# Patient Record
Sex: MALE | Race: WHITE | Employment: OTHER | URBAN - NONMETROPOLITAN AREA
[De-identification: names, ages, dates, MRNs, and addresses within clinical notes are randomized per-mention and may not be internally consistent; named-entity substitution may affect disease eponyms.]

---

## 2017-02-23 ENCOUNTER — TELEPHONE (OUTPATIENT)
Dept: GASTROENTEROLOGY | Age: 67
End: 2017-02-23

## 2017-03-10 RX ORDER — ESOMEPRAZOLE MAGNESIUM 40 MG/1
40 CAPSULE, DELAYED RELEASE ORAL
Qty: 30 CAPSULE | Refills: 11 | Status: SHIPPED | OUTPATIENT
Start: 2017-03-10 | End: 2017-12-04 | Stop reason: ALTCHOICE

## 2017-04-11 ENCOUNTER — HOSPITAL ENCOUNTER (OUTPATIENT)
Dept: NON INVASIVE DIAGNOSTICS | Age: 67
Discharge: HOME | End: 2017-04-11
Payer: MEDICARE

## 2017-04-11 PROCEDURE — 93005 ELECTROCARDIOGRAM TRACING: CPT

## 2017-04-12 ENCOUNTER — TELEPHONE (OUTPATIENT)
Dept: GASTROENTEROLOGY | Age: 67
End: 2017-04-12

## 2017-05-12 ENCOUNTER — TELEPHONE (OUTPATIENT)
Dept: GASTROENTEROLOGY | Age: 67
End: 2017-05-12

## 2017-07-31 ENCOUNTER — OFFICE VISIT (OUTPATIENT)
Dept: CARDIOLOGY | Age: 67
End: 2017-07-31
Payer: MEDICARE

## 2017-07-31 VITALS
HEART RATE: 54 BPM | HEIGHT: 70 IN | WEIGHT: 170 LBS | DIASTOLIC BLOOD PRESSURE: 64 MMHG | SYSTOLIC BLOOD PRESSURE: 128 MMHG | BODY MASS INDEX: 24.34 KG/M2

## 2017-07-31 DIAGNOSIS — I10 ESSENTIAL HYPERTENSION: ICD-10-CM

## 2017-07-31 DIAGNOSIS — R53.82 CHRONIC FATIGUE: ICD-10-CM

## 2017-07-31 DIAGNOSIS — R00.2 PALPITATIONS: Primary | ICD-10-CM

## 2017-07-31 PROCEDURE — 3017F COLORECTAL CA SCREEN DOC REV: CPT | Performed by: INTERNAL MEDICINE

## 2017-07-31 PROCEDURE — G8427 DOCREV CUR MEDS BY ELIG CLIN: HCPCS | Performed by: INTERNAL MEDICINE

## 2017-07-31 PROCEDURE — G8420 CALC BMI NORM PARAMETERS: HCPCS | Performed by: INTERNAL MEDICINE

## 2017-07-31 PROCEDURE — 4040F PNEUMOC VAC/ADMIN/RCVD: CPT | Performed by: INTERNAL MEDICINE

## 2017-07-31 PROCEDURE — 93000 ELECTROCARDIOGRAM COMPLETE: CPT | Performed by: INTERNAL MEDICINE

## 2017-07-31 PROCEDURE — 99203 OFFICE O/P NEW LOW 30 MIN: CPT | Performed by: INTERNAL MEDICINE

## 2017-07-31 PROCEDURE — 1123F ACP DISCUSS/DSCN MKR DOCD: CPT | Performed by: INTERNAL MEDICINE

## 2017-07-31 PROCEDURE — 1036F TOBACCO NON-USER: CPT | Performed by: INTERNAL MEDICINE

## 2017-12-04 ENCOUNTER — OFFICE VISIT (OUTPATIENT)
Dept: CARDIOLOGY | Age: 67
End: 2017-12-04
Payer: MEDICARE

## 2017-12-04 VITALS
SYSTOLIC BLOOD PRESSURE: 124 MMHG | HEIGHT: 70 IN | DIASTOLIC BLOOD PRESSURE: 64 MMHG | HEART RATE: 74 BPM | WEIGHT: 185 LBS | BODY MASS INDEX: 26.48 KG/M2

## 2017-12-04 DIAGNOSIS — R00.2 PALPITATIONS: ICD-10-CM

## 2017-12-04 DIAGNOSIS — I10 ESSENTIAL HYPERTENSION: Primary | ICD-10-CM

## 2017-12-04 PROCEDURE — 4040F PNEUMOC VAC/ADMIN/RCVD: CPT | Performed by: INTERNAL MEDICINE

## 2017-12-04 PROCEDURE — 93000 ELECTROCARDIOGRAM COMPLETE: CPT | Performed by: INTERNAL MEDICINE

## 2017-12-04 PROCEDURE — G8484 FLU IMMUNIZE NO ADMIN: HCPCS | Performed by: INTERNAL MEDICINE

## 2017-12-04 PROCEDURE — 1036F TOBACCO NON-USER: CPT | Performed by: INTERNAL MEDICINE

## 2017-12-04 PROCEDURE — 99213 OFFICE O/P EST LOW 20 MIN: CPT | Performed by: INTERNAL MEDICINE

## 2017-12-04 PROCEDURE — 3017F COLORECTAL CA SCREEN DOC REV: CPT | Performed by: INTERNAL MEDICINE

## 2017-12-04 PROCEDURE — G8419 CALC BMI OUT NRM PARAM NOF/U: HCPCS | Performed by: INTERNAL MEDICINE

## 2017-12-04 PROCEDURE — 1123F ACP DISCUSS/DSCN MKR DOCD: CPT | Performed by: INTERNAL MEDICINE

## 2017-12-04 PROCEDURE — G8427 DOCREV CUR MEDS BY ELIG CLIN: HCPCS | Performed by: INTERNAL MEDICINE

## 2017-12-04 ASSESSMENT — ENCOUNTER SYMPTOMS: SHORTNESS OF BREATH: 0

## 2017-12-04 NOTE — PROGRESS NOTES
Dr Rainey Dubin, 3 yr recall    UPPER GASTROINTESTINAL ENDOSCOPY  1/18/2016    EGD BIOPSY performed by Anna Go DO at [facility]       Family History   Problem Relation Age of Onset    Stroke Mother     Heart Failure Mother     Diabetes Mother     Colon Cancer Father     Colon Polyps Father     Liver Cancer Father     Colon Cancer Maternal Grandmother     Colon Polyps Maternal Grandmother     Colon Cancer Paternal Grandfather     Colon Polyps Paternal Grandfather     Esophageal Cancer Neg Hx     Liver Disease Neg Hx     Rectal Cancer Neg Hx     Stomach Cancer Neg Hx        Social History     Social History    Marital status:      Spouse name: N/A    Number of children: N/A    Years of education: N/A     Occupational History    Not on file. Social History Main Topics    Smoking status: Never Smoker    Smokeless tobacco: Never Used    Alcohol use Yes      Comment: social    Drug use: No    Sexual activity: Yes     Partners: Female     Other Topics Concern    Not on file     Social History Narrative    No narrative on file       Allergies   Allergen Reactions    Dilaudid [Fd&C Blue #1 Al Aly-Hydromorphone] Nausea Only         Current Outpatient Prescriptions:     fluticasone (FLONASE) 50 MCG/ACT nasal spray, 2 sprays by Nasal route daily, Disp: , Rfl:     sildenafil (REVATIO) 20 MG tablet, Take 20 mg by mouth 3 4 tablets prn, Disp: , Rfl:     magnesium gluconate (MAGONATE) 500 MG tablet, Take 125 mg by mouth daily , Disp: , Rfl:     levothyroxine (SYNTHROID) 50 MCG tablet, Take 50 mcg by mouth Daily. , Disp: , Rfl:     Fexofenadine HCl (ALLEGRA PO), Take  by mouth., Disp: , Rfl:     PE:  Vitals:    12/04/17 1337   BP: 124/64   Pulse: 74       Estimated body mass index is 26.54 kg/m² as calculated from the following:    Height as of this encounter: 5' 10\" (1.778 m). Weight as of this encounter: 185 lb (83.9 kg).     General - No acute distress  Eyes - PERRL, anicteric sclerae; no lid-lag  ENMT - Atraumatic; Mucous membranes moist, oropharynx clear  Neck - trachea midline, thyroid non-tender  Cardio - No jugular venous distension                Clear s1 s2, no gallop, rub, murmur                 No edema, normal pulses  Resp - Normal effort, Clear to auscultation bilaterally  GI - abdomen soft, non-tender, no hepatosplenomegaly  Skin - warm and dry; no rashes  Psych - A+O x 3, normal affect    Lab Results   Component Value Date    CREATININE 0.8 01/30/2013    HGB 16.2 01/30/2013       ECG 12/04/17  Sinus bradycardia     Assessment, Recommendations, & Plan:  79 y.o. male with palpitations. Palpitations - We discussed the possibility that his hiatal hernia could've been putting pressure on his heart causing his arrhythmia which is possible. Regardless if he should have another event I placed a standing order for an EKG and also Zio patch. I've also given him my personal cell phone number. He is on his way to [facility] in February for vacation. I think it's okay for him to go at this point. Disposition - RTC in 4 months or sooner if needed    Thank you very much for allowing me to participate in this patient's care. Please do not hesitate to contact me for any questions or concerns. Sincerely yours,    Aurelio Cardenas MD, MSc  Structural Heart Disease Interventions  [facility]

## 2018-07-26 ENCOUNTER — OFFICE VISIT (OUTPATIENT)
Dept: GASTROENTEROLOGY | Age: 68
End: 2018-07-26
Payer: MEDICARE

## 2018-07-26 VITALS
HEIGHT: 70 IN | SYSTOLIC BLOOD PRESSURE: 110 MMHG | DIASTOLIC BLOOD PRESSURE: 70 MMHG | WEIGHT: 180 LBS | HEART RATE: 77 BPM | BODY MASS INDEX: 25.77 KG/M2 | OXYGEN SATURATION: 97 %

## 2018-07-26 DIAGNOSIS — Z12.11 SCREENING FOR COLON CANCER: ICD-10-CM

## 2018-07-26 DIAGNOSIS — Z80.0 FAMILY HISTORY OF COLON CANCER: ICD-10-CM

## 2018-07-26 DIAGNOSIS — Z86.010 HISTORY OF COLON POLYPS: Primary | ICD-10-CM

## 2018-07-26 PROBLEM — Z86.0100 HISTORY OF COLON POLYPS: Status: ACTIVE | Noted: 2018-07-26

## 2018-07-26 PROCEDURE — 1101F PT FALLS ASSESS-DOCD LE1/YR: CPT | Performed by: NURSE PRACTITIONER

## 2018-07-26 PROCEDURE — G8427 DOCREV CUR MEDS BY ELIG CLIN: HCPCS | Performed by: NURSE PRACTITIONER

## 2018-07-26 PROCEDURE — 1036F TOBACCO NON-USER: CPT | Performed by: NURSE PRACTITIONER

## 2018-07-26 PROCEDURE — 99214 OFFICE O/P EST MOD 30 MIN: CPT | Performed by: NURSE PRACTITIONER

## 2018-07-26 PROCEDURE — 1123F ACP DISCUSS/DSCN MKR DOCD: CPT | Performed by: NURSE PRACTITIONER

## 2018-07-26 PROCEDURE — G8419 CALC BMI OUT NRM PARAM NOF/U: HCPCS | Performed by: NURSE PRACTITIONER

## 2018-07-26 PROCEDURE — 4040F PNEUMOC VAC/ADMIN/RCVD: CPT | Performed by: NURSE PRACTITIONER

## 2018-07-26 PROCEDURE — 3017F COLORECTAL CA SCREEN DOC REV: CPT | Performed by: NURSE PRACTITIONER

## 2018-07-26 RX ORDER — CHOLECALCIFEROL (VITAMIN D3) 1250 MCG
1 CAPSULE ORAL WEEKLY
COMMUNITY

## 2018-07-26 RX ORDER — ALENDRONATE SODIUM 70 MG/1
70 TABLET ORAL
COMMUNITY
End: 2019-12-09

## 2018-07-26 ASSESSMENT — ENCOUNTER SYMPTOMS
NAUSEA: 0
RECTAL PAIN: 0
VOICE CHANGE: 0
ABDOMINAL PAIN: 0
BLOOD IN STOOL: 0
CONSTIPATION: 0
VOMITING: 0
COUGH: 0
SORE THROAT: 0
CHEST TIGHTNESS: 0
SHORTNESS OF BREATH: 0
BACK PAIN: 0
ABDOMINAL DISTENTION: 0
DIARRHEA: 0

## 2018-07-26 NOTE — PROGRESS NOTES
nausea, rectal pain and vomiting. Musculoskeletal: Positive for arthralgias. Negative for back pain and gait problem. Skin: Negative for pallor, rash and wound. Neurological: Negative for dizziness, weakness and light-headedness. Hematological: Negative for adenopathy. Does not bruise/bleed easily. All other systems reviewed and are negative. Objective:   Physical Exam   Constitutional: He is oriented to person, place, and time. He appears well-developed and well-nourished. No distress. /70   Pulse 77   Ht 5' 10\" (1.778 m)   Wt 180 lb (81.6 kg)   SpO2 97%   BMI 25.83 kg/m²      Eyes: Conjunctivae are normal. No scleral icterus. Neck: No tracheal deviation present. Cardiovascular: Normal rate and regular rhythm. Exam reveals no gallop and no friction rub. No murmur heard. Pulmonary/Chest: Effort normal and breath sounds normal. No respiratory distress. He has no wheezes. He has no rhonchi. He has no rales. Abdominal: Soft. Normal appearance and bowel sounds are normal. He exhibits no distension and no mass. There is no hepatomegaly. There is no tenderness. There is no rebound and no guarding. Musculoskeletal: He exhibits no edema. Neurological: He is alert and oriented to person, place, and time. He has normal strength. Skin: Skin is warm, dry and intact. No cyanosis. No pallor. Psychiatric: He has a normal mood and affect. His behavior is normal. Thought content normal. Cognition and memory are normal.       Assessment:      1. History of colon polyps    2. Family history of colon cancer    3. Screening for colon cancer          Plan:      Schedule colonoscopy screening    Instruct on bowel prep. Nothing to eat or drink after midnight the day of the exam.  Unable to drive for 24 hours after the procedure. No aspirin or nonsteroidal anti-inflammatories for 5 days before procedure.     I have discussed the benefits, alternatives, and risks (including bleeding,

## 2018-08-02 ENCOUNTER — HOSPITAL ENCOUNTER (OUTPATIENT)
Age: 68
Setting detail: OUTPATIENT SURGERY
Discharge: HOME | End: 2018-08-02
Attending: INTERNAL MEDICINE | Admitting: INTERNAL MEDICINE
Payer: MEDICARE

## 2018-08-02 ENCOUNTER — ANESTHESIA (OUTPATIENT)
Dept: OPERATING ROOM | Age: 68
End: 2018-08-02

## 2018-08-02 ENCOUNTER — ANESTHESIA EVENT (OUTPATIENT)
Dept: OPERATING ROOM | Age: 68
End: 2018-08-02

## 2018-08-02 VITALS
BODY MASS INDEX: 24.34 KG/M2 | DIASTOLIC BLOOD PRESSURE: 56 MMHG | OXYGEN SATURATION: 97 % | SYSTOLIC BLOOD PRESSURE: 93 MMHG | HEART RATE: 78 BPM | HEIGHT: 70 IN | TEMPERATURE: 97 F | RESPIRATION RATE: 18 BRPM | WEIGHT: 170 LBS

## 2018-08-02 VITALS — DIASTOLIC BLOOD PRESSURE: 85 MMHG | OXYGEN SATURATION: 96 % | SYSTOLIC BLOOD PRESSURE: 112 MMHG

## 2018-08-02 PROCEDURE — G0105 COLORECTAL SCRN; HI RISK IND: HCPCS | Performed by: INTERNAL MEDICINE

## 2018-08-02 PROCEDURE — G8907 PT DOC NO EVENTS ON DISCHARG: HCPCS

## 2018-08-02 PROCEDURE — G8918 PT W/O PREOP ORDER IV AB PRO: HCPCS

## 2018-08-02 PROCEDURE — G0105 COLORECTAL SCRN; HI RISK IND: HCPCS

## 2018-08-02 RX ORDER — LIDOCAINE HYDROCHLORIDE 10 MG/ML
1 INJECTION, SOLUTION EPIDURAL; INFILTRATION; INTRACAUDAL; PERINEURAL
Status: COMPLETED | OUTPATIENT
Start: 2018-08-02 | End: 2018-08-02

## 2018-08-02 RX ORDER — PROPOFOL 10 MG/ML
INJECTION, EMULSION INTRAVENOUS PRN
Status: DISCONTINUED | OUTPATIENT
Start: 2018-08-02 | End: 2018-08-02 | Stop reason: SDUPTHER

## 2018-08-02 RX ORDER — SODIUM CHLORIDE 9 MG/ML
INJECTION, SOLUTION INTRAVENOUS CONTINUOUS
Status: DISCONTINUED | OUTPATIENT
Start: 2018-08-02 | End: 2018-08-02 | Stop reason: HOSPADM

## 2018-08-02 RX ADMIN — LIDOCAINE HYDROCHLORIDE 25 MG: 10 INJECTION, SOLUTION EPIDURAL; INFILTRATION; INTRACAUDAL; PERINEURAL at 08:53

## 2018-08-02 RX ADMIN — SODIUM CHLORIDE: 9 INJECTION, SOLUTION INTRAVENOUS at 08:30

## 2018-08-02 RX ADMIN — PROPOFOL 200 MG: 10 INJECTION, EMULSION INTRAVENOUS at 08:53

## 2018-08-02 NOTE — ANESTHESIA PRE PROCEDURE
History:        Procedure Laterality Date    APPENDECTOMY  04/2018    partical resection of cecum    COLONOSCOPY  2004 ?     COLONOSCOPY  3/27/13    HP, isolated ulceration at TI with bx indicating moderate chronic inflammation    CYST REMOVAL  08/2016    R testicle    DIAPHRAGMATIC HERNIA REPAIR  05/17/2017    Dr. Glory Reynoso ARTHROSCOPY      NASAL SEPTUM SURGERY      OTHER SURGICAL HISTORY  05/17/2017    Jess Watt: TIF procedure (reflux)    TURP      UPPER GASTROINTESTINAL ENDOSCOPY  3/27/13    GERD, biopsy pos Ashton's, neg dysplasia. biopsies neg EoE, CS    UPPER GASTROINTESTINAL ENDOSCOPY  3/2014    gastritis, esophagitis. biopsies neg for intestinal metaplasia/dysplasia    UPPER GASTROINTESTINAL ENDOSCOPY  1/18/16    Dr Florin Antonio, 3 yr recall    UPPER GASTROINTESTINAL ENDOSCOPY  1/18/2016    EGD BIOPSY performed by Manuelito Domínguez DO at [facility]       Social History:    Social History   Substance Use Topics    Smoking status: Never Smoker    Smokeless tobacco: Never Used    Alcohol use Yes      Comment: social                                Counseling given: Not Answered      Vital Signs (Current): There were no vitals filed for this visit.                                            BP Readings from Last 3 Encounters:   07/26/18 110/70   12/04/17 124/64   07/31/17 128/64       NPO Status:                                                                                 BMI:   Wt Readings from Last 3 Encounters:   07/26/18 180 lb (81.6 kg)   12/04/17 185 lb (83.9 kg)   07/31/17 170 lb (77.1 kg)     There is no height or weight on file to calculate BMI.    CBC:   Lab Results   Component Value Date    WBC 6.35 01/30/2013    RBC 5.02 01/30/2013    HGB 16.2 01/30/2013    HCT 47.5 01/30/2013    MCV 94.6 01/30/2013    RDW 12.9 01/30/2013     01/30/2013       CMP:   Lab Results   Component Value Date     01/30/2013    K 4.2 01/30/2013     01/30/2013    CO2 32 01/30/2013    BUN 20 01/30/2013    CREATININE 0.8 01/30/2013    LABGLOM > 60 01/30/2013    GLUCOSE 95 01/30/2013    CALCIUM 9.4 01/30/2013       POC Tests: No results for input(s): POCGLU, POCNA, POCK, POCCL, POCBUN, POCHEMO, POCHCT in the last 72 hours. Coags: No results found for: PROTIME, INR, APTT    HCG (If Applicable): No results found for: PREGTESTUR, PREGSERUM, HCG, HCGQUANT     ABGs: No results found for: PHART, PO2ART, WAC9OOZ, UTU9QMW, BEART, A8FIBEEB     Type & Screen (If Applicable):  No results found for: LABABO, [address]    Anesthesia Evaluation  Patient summary reviewed and Nursing notes reviewed  Airway: Mallampati: I  TM distance: >3 FB   Neck ROM: full  Mouth opening: > = 3 FB Dental:          Pulmonary:   (+) sleep apnea: on CPAP,                             Cardiovascular:    (+) dysrhythmias:,                ROS comment: Occasional irregular     Neuro/Psych:               GI/Hepatic/Renal:   (+) GERD: poorly controlled,           Endo/Other:    (+) hypothyroidism::., .                 Abdominal:           Vascular:                                          Anesthesia Plan      general     ASA 2       Induction: intravenous. Anesthetic plan and risks discussed with patient.                       HARLEY Laura - CRNA   8/2/2018

## 2018-08-02 NOTE — H&P
Patient Name: Anand Mccormack  : 1950  MRN: [id number]    Allergies: Allergies   Allergen Reactions    Dilaudid [Fd&C Blue #1 Al Aly-Hydromorphone] Nausea Only         ENDOSCOPY / COLONOSCOPY / BRONCHOSCOPY      PRE-SEDATION ASSESSMENT      Procedure:    [x] Colonoscopy     [] Endoscopy      [] ERCP      [] Bronchoscopy      [] Other  [] History and Physical completed in chart for Inpatient or within 30 daysfrom office. I have examined the patient's status immediately prior to the procedure and:    [x] No interval change in patient status since H&P completed  [] Interval change in patient status (explained below)           BRIEF H&P    HPI/changes/indicators/diagnosis  Active Hospital Problems    Diagnosis Date Noted    Family history of colon cancer [Z80.0] 2018    History of colon polyps [Z86.010] 2018       Medications:   Prior to Admission medications    Medication Sig Start Date End Date Taking? Authorizing Provider   levothyroxine (SYNTHROID) 50 MCG tablet Take 50 mcg by mouth Daily. Yes Historical Provider, MD   Cholecalciferol (VITAMIN D3) 26881 units CAPS Take by mouth    Historical Provider, MD   alendronate (FOSAMAX) 70 MG tablet Take 70 mg by mouth every 7 days    Historical Provider, MD   fluticasone (FLONASE) 50 MCG/ACT nasal spray 2 sprays by Nasal route daily    Historical Provider, MD   sildenafil (REVATIO) 20 MG tablet Take 20 mg by mouth 3 4 tablets prn    Historical Provider, MD   magnesium gluconate (MAGONATE) 500 MG tablet Take 125 mg by mouth daily     Historical Provider, MD   Fexofenadine HCl (ALLEGRA PO) Take  by mouth. Historical Provider, MD       Allergies:   is allergic to dilaudid [fd&c blue #1 [place]-hydromorphone].       Vital Signs:   Vitals:    18 0826   BP: 131/89   Pulse: 74   Resp: 20   Temp: 97 °F (36.1 °C)   SpO2: 99%       ROS:  Cardiac:  [x]WNL  []Comments:  Pulmonary:  [x]WNL   []Comments:  Neuro/Mental Status:  [x]WNL []Comments:  Abdominal:                   Active Hospital Problems    Diagnosis Date Noted    Family history of colon cancer [Z80.0] 07/26/2018    History of colon polyps [Z86.010] 07/26/2018        All other pertinent GI symptoms negative. Physical Exam:  Cardiac:  [x]WNL  []Comments:  Pulmonary:  [x]WNL   []Comments:  Neuro/Mental Status:  [x]WNL  []Comments:  Abdominal:  [x]WNL    []Comments:  Other:   []WNL  []Comments:    Informed Consent:  The risks and benefits of the procedure have been discussed with either the patient or if they cannot consent, their representative. Assessment:  Patient examined and appropriate for planned sedation and procedure. Plan:  Proceed with planned sedation and procedure as above.     Demar Patterson DO  8:48 AM

## 2018-08-25 PROBLEM — Z12.11 SCREENING FOR COLON CANCER: Status: RESOLVED | Noted: 2018-07-26 | Resolved: 2018-08-25

## 2019-12-06 ENCOUNTER — TELEPHONE (OUTPATIENT)
Dept: CARDIOLOGY | Age: 69
End: 2019-12-06

## 2019-12-09 ENCOUNTER — OFFICE VISIT (OUTPATIENT)
Dept: CARDIOLOGY | Age: 69
End: 2019-12-09
Payer: MEDICARE

## 2019-12-09 ENCOUNTER — TELEPHONE (OUTPATIENT)
Dept: CARDIOLOGY | Age: 69
End: 2019-12-09

## 2019-12-09 VITALS
WEIGHT: 186 LBS | DIASTOLIC BLOOD PRESSURE: 82 MMHG | HEART RATE: 71 BPM | SYSTOLIC BLOOD PRESSURE: 128 MMHG | BODY MASS INDEX: 26.63 KG/M2 | HEIGHT: 70 IN

## 2019-12-09 DIAGNOSIS — R55 VASOVAGAL SYNCOPE: ICD-10-CM

## 2019-12-09 DIAGNOSIS — G47.30 SLEEP APNEA, UNSPECIFIED TYPE: ICD-10-CM

## 2019-12-09 DIAGNOSIS — R06.02 SHORTNESS OF BREATH: ICD-10-CM

## 2019-12-09 DIAGNOSIS — I48.0 PAROXYSMAL ATRIAL FIBRILLATION (HCC): Primary | ICD-10-CM

## 2019-12-09 PROBLEM — I10 HYPERTENSION: Status: RESOLVED | Noted: 2017-07-31 | Resolved: 2019-12-09

## 2019-12-09 PROCEDURE — 0296T PR EXT ECG > 48HR TO 21 DAY RCRD W/CONECT INTL RCRD: CPT | Performed by: CLINICAL NURSE SPECIALIST

## 2019-12-09 PROCEDURE — 1123F ACP DISCUSS/DSCN MKR DOCD: CPT | Performed by: CLINICAL NURSE SPECIALIST

## 2019-12-09 PROCEDURE — 4040F PNEUMOC VAC/ADMIN/RCVD: CPT | Performed by: CLINICAL NURSE SPECIALIST

## 2019-12-09 PROCEDURE — 3017F COLORECTAL CA SCREEN DOC REV: CPT | Performed by: CLINICAL NURSE SPECIALIST

## 2019-12-09 PROCEDURE — G8417 CALC BMI ABV UP PARAM F/U: HCPCS | Performed by: CLINICAL NURSE SPECIALIST

## 2019-12-09 PROCEDURE — 99204 OFFICE O/P NEW MOD 45 MIN: CPT | Performed by: CLINICAL NURSE SPECIALIST

## 2019-12-09 PROCEDURE — 1036F TOBACCO NON-USER: CPT | Performed by: CLINICAL NURSE SPECIALIST

## 2019-12-09 PROCEDURE — G8484 FLU IMMUNIZE NO ADMIN: HCPCS | Performed by: CLINICAL NURSE SPECIALIST

## 2019-12-09 PROCEDURE — G8427 DOCREV CUR MEDS BY ELIG CLIN: HCPCS | Performed by: CLINICAL NURSE SPECIALIST

## 2019-12-09 PROCEDURE — 93000 ELECTROCARDIOGRAM COMPLETE: CPT | Performed by: CLINICAL NURSE SPECIALIST

## 2019-12-09 ASSESSMENT — ENCOUNTER SYMPTOMS
FACIAL SWELLING: 0
EYE REDNESS: 0
NAUSEA: 0
CHEST TIGHTNESS: 0
SHORTNESS OF BREATH: 1
VOMITING: 0
WHEEZING: 0
COUGH: 0
ABDOMINAL PAIN: 0

## 2019-12-13 ENCOUNTER — HOSPITAL ENCOUNTER (OUTPATIENT)
Dept: NON INVASIVE DIAGNOSTICS | Age: 69
Discharge: HOME | End: 2019-12-13
Payer: MEDICARE

## 2019-12-13 DIAGNOSIS — I48.0 PAROXYSMAL ATRIAL FIBRILLATION (HCC): ICD-10-CM

## 2019-12-13 DIAGNOSIS — I48.91 ATRIAL FIBRILLATION, UNSPECIFIED TYPE (HCC): Primary | ICD-10-CM

## 2019-12-13 DIAGNOSIS — R06.02 SHORTNESS OF BREATH: ICD-10-CM

## 2019-12-13 DIAGNOSIS — R55 VASOVAGAL SYNCOPE: ICD-10-CM

## 2019-12-13 LAB
LV EF: 58 %
LVEF MODALITY: NORMAL

## 2019-12-13 PROCEDURE — 93307 TTE W/O DOPPLER COMPLETE: CPT

## 2019-12-18 ENCOUNTER — HOSPITAL ENCOUNTER (OUTPATIENT)
Dept: NUCLEAR MEDICINE | Age: 69
Discharge: HOME | End: 2019-12-20
Payer: MEDICARE

## 2019-12-18 ENCOUNTER — HOSPITAL ENCOUNTER (OUTPATIENT)
Dept: NON INVASIVE DIAGNOSTICS | Age: 69
Discharge: HOME | End: 2019-12-18
Payer: MEDICARE

## 2019-12-18 ENCOUNTER — HOSPITAL ENCOUNTER (OUTPATIENT)
Dept: NUCLEAR MEDICINE | Age: 69
End: 2019-12-18
Payer: MEDICARE

## 2019-12-18 DIAGNOSIS — I48.0 AF (PAROXYSMAL ATRIAL FIBRILLATION) (HCC): ICD-10-CM

## 2019-12-18 DIAGNOSIS — R06.02 SHORTNESS OF BREATH: ICD-10-CM

## 2019-12-18 PROCEDURE — 93017 CV STRESS TEST TRACING ONLY: CPT

## 2019-12-18 PROCEDURE — A9500 TC99M SESTAMIBI: HCPCS | Performed by: CLINICAL NURSE SPECIALIST

## 2019-12-18 PROCEDURE — 3430000000 HC RX DIAGNOSTIC RADIOPHARMACEUTICAL: Performed by: CLINICAL NURSE SPECIALIST

## 2019-12-18 PROCEDURE — 78452 HT MUSCLE IMAGE SPECT MULT: CPT | Performed by: INTERNAL MEDICINE

## 2019-12-18 PROCEDURE — 78452 HT MUSCLE IMAGE SPECT MULT: CPT

## 2019-12-18 RX ADMIN — TETRAKIS(2-METHOXYISOBUTYLISOCYANIDE)COPPER(I) TETRAFLUOROBORATE 10 MILLICURIE: 1 INJECTION, POWDER, LYOPHILIZED, FOR SOLUTION INTRAVENOUS at 13:41

## 2019-12-18 RX ADMIN — TETRAKIS(2-METHOXYISOBUTYLISOCYANIDE)COPPER(I) TETRAFLUOROBORATE 30 MILLICURIE: 1 INJECTION, POWDER, LYOPHILIZED, FOR SOLUTION INTRAVENOUS at 13:42

## 2019-12-19 LAB
LV EF: 56 %
LVEF MODALITY: NORMAL

## 2019-12-20 ENCOUNTER — TELEPHONE (OUTPATIENT)
Dept: CARDIOLOGY | Age: 69
End: 2019-12-20

## 2019-12-30 ENCOUNTER — TELEPHONE (OUTPATIENT)
Dept: CARDIOLOGY | Age: 69
End: 2019-12-30

## 2019-12-31 RX ORDER — METOPROLOL SUCCINATE 25 MG/1
12.5 TABLET, EXTENDED RELEASE ORAL 2 TIMES DAILY
Qty: 30 TABLET | Refills: 5 | Status: SHIPPED | OUTPATIENT
Start: 2019-12-31 | End: 2020-01-03 | Stop reason: SINTOL

## 2019-12-31 RX ORDER — METOPROLOL SUCCINATE 25 MG/1
12.5 TABLET, EXTENDED RELEASE ORAL DAILY
Qty: 30 TABLET | Refills: 5 | Status: CANCELLED | OUTPATIENT
Start: 2019-12-31

## 2020-01-02 ENCOUNTER — HOSPITAL ENCOUNTER (OUTPATIENT)
Dept: CARDIAC CATH/INVASIVE PROCEDURES | Age: 70
Discharge: HOME | End: 2020-01-02
Attending: INTERNAL MEDICINE | Admitting: INTERNAL MEDICINE
Payer: MEDICARE

## 2020-01-02 VITALS
BODY MASS INDEX: 25.48 KG/M2 | DIASTOLIC BLOOD PRESSURE: 80 MMHG | HEIGHT: 70 IN | WEIGHT: 178 LBS | HEART RATE: 82 BPM | OXYGEN SATURATION: 90 % | SYSTOLIC BLOOD PRESSURE: 105 MMHG | RESPIRATION RATE: 17 BRPM | TEMPERATURE: 98.1 F

## 2020-01-02 LAB
ALBUMIN SERPL-MCNC: 4.2 G/DL (ref 3.5–5.2)
ALP BLD-CCNC: 58 U/L (ref 40–130)
ALT SERPL-CCNC: 16 U/L (ref 5–41)
ANION GAP SERPL CALCULATED.3IONS-SCNC: 13 MMOL/L (ref 7–19)
AST SERPL-CCNC: 18 U/L (ref 5–40)
BILIRUB SERPL-MCNC: 0.5 MG/DL (ref 0.2–1.2)
BUN BLDV-MCNC: 18 MG/DL (ref 8–23)
CALCIUM SERPL-MCNC: 8.9 MG/DL (ref 8.8–10.2)
CHLORIDE BLD-SCNC: 101 MMOL/L (ref 98–111)
CO2: 28 MMOL/L (ref 22–29)
CREAT SERPL-MCNC: 0.8 MG/DL (ref 0.5–1.2)
GFR NON-AFRICAN AMERICAN: >60
GLUCOSE BLD-MCNC: 107 MG/DL (ref 74–109)
HCT VFR BLD CALC: 49 % (ref 42–52)
HEMOGLOBIN: 16.2 G/DL (ref 14–18)
MCH RBC QN AUTO: 32.5 PG (ref 27–31)
MCHC RBC AUTO-ENTMCNC: 33.1 G/DL (ref 33–37)
MCV RBC AUTO: 98.2 FL (ref 80–94)
PDW BLD-RTO: 12.8 % (ref 11.5–14.5)
PLATELET # BLD: 226 K/UL (ref 130–400)
PMV BLD AUTO: 10.6 FL (ref 9.4–12.4)
POTASSIUM SERPL-SCNC: 3.8 MMOL/L (ref 3.5–5)
RBC # BLD: 4.99 M/UL (ref 4.7–6.1)
SODIUM BLD-SCNC: 142 MMOL/L (ref 136–145)
TOTAL PROTEIN: 6.5 G/DL (ref 6.6–8.7)
WBC # BLD: 12.4 K/UL (ref 4.8–10.8)

## 2020-01-02 PROCEDURE — 2709999900 HC NON-CHARGEABLE SUPPLY

## 2020-01-02 PROCEDURE — C1894 INTRO/SHEATH, NON-LASER: HCPCS

## 2020-01-02 PROCEDURE — 93458 L HRT ARTERY/VENTRICLE ANGIO: CPT | Performed by: INTERNAL MEDICINE

## 2020-01-02 PROCEDURE — 6360000004 HC RX CONTRAST MEDICATION: Performed by: INTERNAL MEDICINE

## 2020-01-02 PROCEDURE — 2580000003 HC RX 258: Performed by: INTERNAL MEDICINE

## 2020-01-02 PROCEDURE — C1760 CLOSURE DEV, VASC: HCPCS

## 2020-01-02 PROCEDURE — 36415 COLL VENOUS BLD VENIPUNCTURE: CPT

## 2020-01-02 PROCEDURE — 93458 L HRT ARTERY/VENTRICLE ANGIO: CPT

## 2020-01-02 PROCEDURE — 99152 MOD SED SAME PHYS/QHP 5/>YRS: CPT | Performed by: INTERNAL MEDICINE

## 2020-01-02 PROCEDURE — 80053 COMPREHEN METABOLIC PANEL: CPT

## 2020-01-02 PROCEDURE — 99152 MOD SED SAME PHYS/QHP 5/>YRS: CPT

## 2020-01-02 PROCEDURE — 85027 COMPLETE CBC AUTOMATED: CPT

## 2020-01-02 PROCEDURE — 6360000002 HC RX W HCPCS

## 2020-01-02 PROCEDURE — 2500000003 HC RX 250 WO HCPCS

## 2020-01-02 RX ORDER — ACETAMINOPHEN 325 MG/1
650 TABLET ORAL EVERY 4 HOURS PRN
Status: DISCONTINUED | OUTPATIENT
Start: 2020-01-02 | End: 2020-01-02 | Stop reason: HOSPADM

## 2020-01-02 RX ORDER — SODIUM CHLORIDE 9 MG/ML
INJECTION, SOLUTION INTRAVENOUS CONTINUOUS
Status: DISCONTINUED | OUTPATIENT
Start: 2020-01-02 | End: 2020-01-02 | Stop reason: HOSPADM

## 2020-01-02 RX ORDER — SODIUM CHLORIDE 0.9 % (FLUSH) 0.9 %
10 SYRINGE (ML) INJECTION PRN
Status: DISCONTINUED | OUTPATIENT
Start: 2020-01-02 | End: 2020-01-02 | Stop reason: HOSPADM

## 2020-01-02 RX ORDER — NITROGLYCERIN 0.4 MG/1
0.4 TABLET SUBLINGUAL EVERY 5 MIN PRN
Status: DISCONTINUED | OUTPATIENT
Start: 2020-01-02 | End: 2020-01-02 | Stop reason: HOSPADM

## 2020-01-02 RX ORDER — ONDANSETRON 2 MG/ML
4 INJECTION INTRAMUSCULAR; INTRAVENOUS EVERY 6 HOURS PRN
Status: DISCONTINUED | OUTPATIENT
Start: 2020-01-02 | End: 2020-01-02 | Stop reason: HOSPADM

## 2020-01-02 RX ORDER — SODIUM CHLORIDE 0.9 % (FLUSH) 0.9 %
10 SYRINGE (ML) INJECTION EVERY 12 HOURS SCHEDULED
Status: DISCONTINUED | OUTPATIENT
Start: 2020-01-02 | End: 2020-01-02 | Stop reason: HOSPADM

## 2020-01-02 RX ORDER — ALPRAZOLAM 0.5 MG/1
0.5 TABLET ORAL
Status: DISCONTINUED | OUTPATIENT
Start: 2020-01-02 | End: 2020-01-02 | Stop reason: HOSPADM

## 2020-01-02 RX ADMIN — IOPAMIDOL 76 ML: 612 INJECTION, SOLUTION INTRAVENOUS at 08:43

## 2020-01-02 RX ADMIN — SODIUM CHLORIDE: 9 INJECTION, SOLUTION INTRAVENOUS at 07:35

## 2020-01-02 ASSESSMENT — ENCOUNTER SYMPTOMS
ABDOMINAL PAIN: 0
WHEEZING: 0
DIARRHEA: 0
VOMITING: 0
COUGH: 0
ABDOMINAL DISTENTION: 0
BLOOD IN STOOL: 0
SHORTNESS OF BREATH: 0
BACK PAIN: 0

## 2020-01-02 NOTE — H&P
Patient:  Reji Garcia III                  1950  MRN: [id number]    PROBLEM LIST:    Patient Active Problem List    Diagnosis Date Noted    Vasovagal syncope 12/09/2019     Priority: Low    Sleep apnea 12/09/2019     Priority: Low    History of colon polyps 07/26/2018     Priority: Low    Family history of colon cancer 07/26/2018     Priority: Low    Chronic GERD 11/01/2016     Priority: Low    Gastroesophageal reflux disease with esophagitis 10/29/2015     Priority: Low    Short-segment Ashton's esophagus 10/29/2015     Priority: Low       PRESENTATION: Reji Garcia III is a 71y.o. year old male who presents with recent sleep study showing possible atrial flutter/fibrillation. A subsequent Zio monitor showed 22% atrial fibrillation burden. He underwent a stress test as well as an echo which showed normal LV function with possible inferior ischemia. He is being referred for cardiac catheterization. He has been started on Eliquis. He has a history of vasovagal syncope. REVIEW OF SYSTEMS:  Review of Systems   Constitutional: Negative for activity change, diaphoresis and fatigue. HENT: Negative for hearing loss, nosebleeds and tinnitus. Eyes: Negative for visual disturbance. Respiratory: Negative for cough, shortness of breath and wheezing. Cardiovascular: Positive for palpitations. Negative for chest pain and leg swelling. Gastrointestinal: Negative for abdominal distention, abdominal pain, blood in stool, diarrhea and vomiting. Endocrine: Negative for cold intolerance, heat intolerance, polydipsia, polyphagia and polyuria. Genitourinary: Negative for difficulty urinating, flank pain and hematuria. Musculoskeletal: Negative for arthralgias, back pain, joint swelling and myalgias. Skin: Negative for pallor and rash. Neurological: Negative for dizziness, seizures, syncope and headaches. Psychiatric/Behavioral: Negative for behavioral problems and dysphoric mood.  The patient is not nervous/anxious. Past Medical History:      Diagnosis Date    Colon polyp     GERD (gastroesophageal reflux disease)     Irregular heart beat     on occ/better now    Osteoarthritis     knee left    PAC (premature atrial contraction)     PONV (postoperative nausea and vomiting)     Seasonal allergies     Sleep apnea     chele appliance used    Thyroid disease     Unspecified sleep apnea     Vaso vagal episode        Past Surgical History:      Procedure Laterality Date    APPENDECTOMY  04/2018    partical resection of cecum    COLONOSCOPY  2004 ?     COLONOSCOPY  3/27/13    HP, isolated ulceration at TI with bx indicating moderate chronic inflammation    CYST REMOVAL  08/2016    R testicle    DIAPHRAGMATIC HERNIA REPAIR  05/17/2017    Dr. Zamorano Setting ARTHROSCOPY      NASAL SEPTUM SURGERY      OTHER SURGICAL HISTORY  05/17/2017    Lafrances Prime: TIF procedure (reflux)    MA COLONOSCOPY FLX DX W/COLLJ SPEC WHEN PFRMD N/A 8/2/2018    Dr Patterson-diverticulosis, 5 yr recall    TURP      UPPER GASTROINTESTINAL ENDOSCOPY  3/27/13    GERD, biopsy pos Ashton's, neg dysplasia. biopsies neg EoE, CS    UPPER GASTROINTESTINAL ENDOSCOPY  3/2014    gastritis, esophagitis. biopsies neg for intestinal metaplasia/dysplasia    UPPER GASTROINTESTINAL ENDOSCOPY  1/18/2016    Dr Maura Lange, 3 yr recall       Medications Prior to Admission:    Prior to Admission medications    Medication Sig Start Date End Date Taking?  Authorizing Provider   metoprolol succinate (TOPROL XL) 25 MG extended release tablet Take 0.5 tablets by mouth 2 times daily 12/31/19   Mace Leather, APRN   MAGNESIUM MALATE PO Take 76 mg PE by mouth daily as needed    Historical Provider, MD   Calcium Carbonate-Vitamin D (CALTRATE 600+D PO) Take 1 tablet by mouth daily    Historical Provider, MD   apixaban (ELIQUIS) 5 MG TABS tablet Take 1 tablet by mouth 2 times daily 12/9/19 1/8/20  Katy Montero History:       Problem Relation Age of Onset    Stroke Mother     Heart Failure Mother     Diabetes Mother     Atrial Fibrillation Mother     Colon Cancer Father     Colon Polyps Father     Liver Cancer Father     Colon Cancer Maternal Grandmother     Colon Polyps Maternal Grandmother     Colon Cancer Paternal Grandfather     Colon Polyps Paternal Grandfather     Other Sister         neuro disease    Esophageal Cancer Neg Hx     Liver Disease Neg Hx     Rectal Cancer Neg Hx     Stomach Cancer Neg Hx      Physical Exam:    Vitals: There were no vitals taken for this visit. 24HR INTAKE/OUTPUT:  No intake or output data in the 24 hours ending 01/02/20 0653    Physical Exam  Constitutional:       Appearance: He is well-developed. HENT:      Mouth/Throat:      Pharynx: No oropharyngeal exudate. Eyes:      General: No scleral icterus. Right eye: No discharge. Left eye: No discharge. Neck:      Thyroid: No thyromegaly. Vascular: No JVD. Cardiovascular:      Rate and Rhythm: Normal rate and regular rhythm. Heart sounds: No murmur. No friction rub. No gallop. Pulmonary:      Effort: No respiratory distress. Breath sounds: No stridor. No wheezing or rales. Abdominal:      General: Bowel sounds are normal. There is no distension. Palpations: Abdomen is soft. There is no mass. Tenderness: There is no tenderness. There is no guarding or rebound. Musculoskeletal:         General: No deformity. Skin:     General: Skin is warm. Coloration: Skin is not pale. Findings: No erythema or rash. Neurological:      Mental Status: He is alert and oriented to person, place, and time. Motor: No abnormal muscle tone. Coordination: Coordination normal.      Deep Tendon Reflexes: Reflexes normal.         LAB DATA:  CBC: No results for input(s): WBC, HGB, PLT in the last 72 hours.   BMP:  No results for input(s): NA, K, CL, CO2, BUN, CREATININE, GLUCOSE in the last 72 hours. Hepatic: No results for input(s): AST, ALT, ALB, BILITOT, ALKPHOS in the last 72 hours. CK, CKMB, Troponin: @LABRCNT (CKTOTAL:3, CKMB:3, TROPONINI:3)@  Pro-BNP: No results for input(s): BNP in the last 72 hours. Lipids: No results for input(s): CHOL, HDL in the last 72 hours. Invalid input(s): LDL  ABGs: No results for input(s): PHART, ADX3GZS, PO2ART, GWV7DDM, BEART, HGBAE, R4ZCQXKW, CARBOXHGBART, 02THERAPY in the last 72 hours. INR: No results for input(s): INR in the last 72 hours. A1c:Invalid input(s): HEMOGLOBIN A1C  URINALYSIS: No results found for: NITRU, WBCUA, BACTERIA, RBCUA, BLOODU, SPECGRAV, GLUCOSEU  -----------------------------------------------------------------  IMAGING:  No orders to display         Assessment and Recommendations: This is a 71y.o. year old male with past medical history of vasovagal syncope, recent sleep study with possible atrial flutter/fibrillation, ZIO monitor showing 20% atrial fibrillation burden, started on Eliquis with subsequent echo showing normal LV function and a stress test reported as possible inferior ischemia. Referred for cardiac catheterization. Risks, benefits, alternatives of cardiac catheterization/PCI discussed with the patient and full informed consent obtained.   Acceptable Mallampati score  Consent for moderate conscious sedation  ASA 3            Electronically signed by Kristina Mae MD on 1/2/2020 at 6:53 AM

## 2020-01-03 ENCOUNTER — TELEPHONE (OUTPATIENT)
Dept: CARDIOLOGY | Age: 70
End: 2020-01-03

## 2020-01-03 RX ORDER — FLECAINIDE ACETATE 50 MG/1
50 TABLET ORAL 2 TIMES DAILY
Qty: 60 TABLET | Refills: 5 | Status: SHIPPED | OUTPATIENT
Start: 2020-01-03 | End: 2020-01-07 | Stop reason: SINTOL

## 2020-01-03 NOTE — TELEPHONE ENCOUNTER
[facility], Dr. Shane Louie would like to see Dr. Pollo Art earlier or if you could ask his advice on this.

## 2020-01-03 NOTE — TELEPHONE ENCOUNTER
Stacey-please call patient and tell him I have discussed with Dr. Cassandra Duran. Discontinue metoprolol and instead start the antiarrhythmic, flecainide 50 mg twice a day. This should have less effect on his blood pressure and should help his atrial fibrillation. I have already sent this to [facility] for him as I will be leaving for the day.

## 2020-01-03 NOTE — TELEPHONE ENCOUNTER
Pt called back and stating he looked up the side effects of flecainide and doesn't want to take that med. Pt wants to speak with Dr. Nathan Fernandez.

## 2020-01-06 ENCOUNTER — TELEPHONE (OUTPATIENT)
Dept: CARDIOLOGY | Age: 70
End: 2020-01-06

## 2020-01-07 ENCOUNTER — TELEPHONE (OUTPATIENT)
Dept: CARDIOLOGY | Age: 70
End: 2020-01-07

## 2020-01-07 ENCOUNTER — OFFICE VISIT (OUTPATIENT)
Dept: CARDIOLOGY | Age: 70
End: 2020-01-07
Payer: MEDICARE

## 2020-01-07 VITALS
SYSTOLIC BLOOD PRESSURE: 112 MMHG | HEART RATE: 93 BPM | HEIGHT: 70 IN | DIASTOLIC BLOOD PRESSURE: 64 MMHG | WEIGHT: 184 LBS | BODY MASS INDEX: 26.34 KG/M2

## 2020-01-07 PROCEDURE — 3017F COLORECTAL CA SCREEN DOC REV: CPT | Performed by: INTERNAL MEDICINE

## 2020-01-07 PROCEDURE — G8484 FLU IMMUNIZE NO ADMIN: HCPCS | Performed by: INTERNAL MEDICINE

## 2020-01-07 PROCEDURE — 4040F PNEUMOC VAC/ADMIN/RCVD: CPT | Performed by: INTERNAL MEDICINE

## 2020-01-07 PROCEDURE — G8427 DOCREV CUR MEDS BY ELIG CLIN: HCPCS | Performed by: INTERNAL MEDICINE

## 2020-01-07 PROCEDURE — 93000 ELECTROCARDIOGRAM COMPLETE: CPT | Performed by: INTERNAL MEDICINE

## 2020-01-07 PROCEDURE — 99213 OFFICE O/P EST LOW 20 MIN: CPT | Performed by: INTERNAL MEDICINE

## 2020-01-07 PROCEDURE — 1036F TOBACCO NON-USER: CPT | Performed by: INTERNAL MEDICINE

## 2020-01-07 PROCEDURE — 1123F ACP DISCUSS/DSCN MKR DOCD: CPT | Performed by: INTERNAL MEDICINE

## 2020-01-07 PROCEDURE — G8417 CALC BMI ABV UP PARAM F/U: HCPCS | Performed by: INTERNAL MEDICINE

## 2020-01-07 RX ORDER — TURMERIC ROOT EXTRACT 500 MG
TABLET ORAL 2 TIMES DAILY
COMMUNITY
End: 2021-05-27

## 2020-01-07 RX ORDER — FLECAINIDE ACETATE 50 MG/1
50 TABLET ORAL 2 TIMES DAILY
Qty: 60 TABLET | Refills: 3 | Status: SHIPPED | OUTPATIENT
Start: 2020-01-07 | End: 2020-02-17 | Stop reason: SINTOL

## 2020-01-07 NOTE — PROGRESS NOTES
therapeutic approaches, agents available and the side effects of each. Considering the fact he is fairly symptomatic he is willing to try flecainide 50 twice daily. We will obtain an EKG after 4 doses of the drug. In parallel fashion we will refer him to an electrophysiologist to discuss the possibility of an ablation. Medical records reviewed prior to today's clinic visit. More than 30 minutes spent face-to-face with patient in evaluating, and carefully explaining their problems and the planned approach.

## 2020-01-07 NOTE — TELEPHONE ENCOUNTER
Got patient in to see Dr. Helio Hernandez at [facility] on 2/18 at 8:30. I have faxed referral records. Notified patient of appointment date, time, location, and Dr. Merrill Bring office number. He voiced understanding.

## 2020-01-10 ENCOUNTER — TELEPHONE (OUTPATIENT)
Dept: CARDIOLOGY | Age: 70
End: 2020-01-10

## 2020-01-10 ENCOUNTER — OFFICE VISIT (OUTPATIENT)
Dept: CARDIOLOGY | Age: 70
End: 2020-01-10
Payer: MEDICARE

## 2020-01-10 VITALS — HEART RATE: 82 BPM | DIASTOLIC BLOOD PRESSURE: 76 MMHG | SYSTOLIC BLOOD PRESSURE: 120 MMHG

## 2020-01-10 PROCEDURE — 4040F PNEUMOC VAC/ADMIN/RCVD: CPT | Performed by: INTERNAL MEDICINE

## 2020-01-10 PROCEDURE — G8428 CUR MEDS NOT DOCUMENT: HCPCS | Performed by: INTERNAL MEDICINE

## 2020-01-10 PROCEDURE — G8484 FLU IMMUNIZE NO ADMIN: HCPCS | Performed by: INTERNAL MEDICINE

## 2020-01-10 PROCEDURE — G8417 CALC BMI ABV UP PARAM F/U: HCPCS | Performed by: INTERNAL MEDICINE

## 2020-01-10 PROCEDURE — 1123F ACP DISCUSS/DSCN MKR DOCD: CPT | Performed by: INTERNAL MEDICINE

## 2020-01-10 PROCEDURE — 3017F COLORECTAL CA SCREEN DOC REV: CPT | Performed by: INTERNAL MEDICINE

## 2020-01-10 PROCEDURE — 93000 ELECTROCARDIOGRAM COMPLETE: CPT | Performed by: INTERNAL MEDICINE

## 2020-01-10 PROCEDURE — 1036F TOBACCO NON-USER: CPT | Performed by: INTERNAL MEDICINE

## 2020-01-10 PROCEDURE — 99213 OFFICE O/P EST LOW 20 MIN: CPT | Performed by: INTERNAL MEDICINE

## 2020-01-10 NOTE — TELEPHONE ENCOUNTER
Patient came in for EKG only to monitor starting Flecainide therapy. Per Dr. Scout Pizarro EKG looked good, continue present therapy and return in 6 weeks. Advised patient of this and he voiced understanding.

## 2020-02-17 ENCOUNTER — OFFICE VISIT (OUTPATIENT)
Dept: CARDIOLOGY | Age: 70
End: 2020-02-17
Payer: MEDICARE

## 2020-02-17 VITALS
HEIGHT: 70 IN | HEART RATE: 78 BPM | WEIGHT: 186 LBS | DIASTOLIC BLOOD PRESSURE: 70 MMHG | BODY MASS INDEX: 26.63 KG/M2 | SYSTOLIC BLOOD PRESSURE: 120 MMHG

## 2020-02-17 PROCEDURE — 93000 ELECTROCARDIOGRAM COMPLETE: CPT | Performed by: INTERNAL MEDICINE

## 2020-02-17 PROCEDURE — G8417 CALC BMI ABV UP PARAM F/U: HCPCS | Performed by: INTERNAL MEDICINE

## 2020-02-17 PROCEDURE — 99211 OFF/OP EST MAY X REQ PHY/QHP: CPT | Performed by: INTERNAL MEDICINE

## 2020-02-17 PROCEDURE — G8427 DOCREV CUR MEDS BY ELIG CLIN: HCPCS | Performed by: INTERNAL MEDICINE

## 2020-02-17 RX ORDER — CHLORAL HYDRATE 500 MG
1000 CAPSULE ORAL DAILY PRN
COMMUNITY
End: 2022-02-24

## 2020-02-17 NOTE — PROGRESS NOTES
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 He and his wife tell me that his heart rate was all over the place, he had low blood pressure, and all things considered just felt poorly. Consequently the drug was discontinued. Is to his frequency of atrial fibrillation 1 monitor review suggested it occurred 22% of the time. I am not sure how reliable this documentation might be. He has an appointment with Dr. Petrona Sheth tomorrow for further pursued. On return today his pressure is 120/70 with a regular pulse of 78. His EKG revealed a sinus mechanism with poor R wave progression and low voltage in the precordial leads.

## 2020-06-09 RX ORDER — APIXABAN 5 MG/1
TABLET, FILM COATED ORAL
Qty: 60 TABLET | Refills: 5 | Status: SHIPPED | OUTPATIENT
Start: 2020-06-09 | End: 2020-12-09

## 2020-08-18 ENCOUNTER — OFFICE VISIT (OUTPATIENT)
Dept: CARDIOLOGY | Age: 70
End: 2020-08-18
Payer: MEDICARE

## 2020-08-18 VITALS
SYSTOLIC BLOOD PRESSURE: 124 MMHG | DIASTOLIC BLOOD PRESSURE: 64 MMHG | HEIGHT: 70 IN | BODY MASS INDEX: 25.62 KG/M2 | WEIGHT: 179 LBS | HEART RATE: 94 BPM

## 2020-08-18 PROCEDURE — 3017F COLORECTAL CA SCREEN DOC REV: CPT | Performed by: INTERNAL MEDICINE

## 2020-08-18 PROCEDURE — G8417 CALC BMI ABV UP PARAM F/U: HCPCS | Performed by: INTERNAL MEDICINE

## 2020-08-18 PROCEDURE — 1036F TOBACCO NON-USER: CPT | Performed by: INTERNAL MEDICINE

## 2020-08-18 PROCEDURE — G8427 DOCREV CUR MEDS BY ELIG CLIN: HCPCS | Performed by: INTERNAL MEDICINE

## 2020-08-18 PROCEDURE — 4040F PNEUMOC VAC/ADMIN/RCVD: CPT | Performed by: INTERNAL MEDICINE

## 2020-08-18 PROCEDURE — 93000 ELECTROCARDIOGRAM COMPLETE: CPT | Performed by: INTERNAL MEDICINE

## 2020-08-18 PROCEDURE — 99212 OFFICE O/P EST SF 10 MIN: CPT | Performed by: INTERNAL MEDICINE

## 2020-08-18 PROCEDURE — 1123F ACP DISCUSS/DSCN MKR DOCD: CPT | Performed by: INTERNAL MEDICINE

## 2020-08-18 RX ORDER — ROSUVASTATIN CALCIUM 5 MG/1
5 TABLET, COATED ORAL NIGHTLY
Qty: 30 TABLET | Refills: 3 | Status: SHIPPED | OUTPATIENT
Start: 2020-08-18 | End: 2020-11-09

## 2020-08-18 NOTE — PROGRESS NOTES
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 Prior to this procedure angiographic assessment revealed preserved LV systolic function and luminal irregularities with some ectasia of his proximal LAD. Since his procedure he has had some palpitations and some very brief little \"runs. \"  He denies any significant semi-sustained or sustained rhythm disturbance. He has been careful with his social distancing. The only real complaint is some occasional orthostasis. On exam he carries 179 pounds in a 5 foot 10 inch frame. Pressure is 124/54 with a pulse of 94 with an occasional extrasystole. EOMs full, sclerae and conjunctiva normal. PERRLA. Mask in place. Trachea midline with no neck masses. Assessment of internal jugular veins reveals no elevation of central venous pressure at 45 degrees. Carotid pulses normal without delay or bruit. Thyroid normal to palpation. Chest exam reveals normal respiratory effort, no abnormal breath sounds and normal expiratory phase. No skin lesions seen. PMI normal. S1, S2 normal without murmur or ray or click. Normal bowel sounds without palpable mass or bruit. No clubbing or acrocyanosis. No significant lower extremity edema or signs of venous insufficiency. General motor strength appears to be within normal limits. Normal range of motion with normal gait. Alert, oriented x 3, memory and cognition normal as reflected by history and conversation. EKG reveals a sinus rhythm with PACs and 2 PVCs along with poor R wave progression and low voltage in the precordial leads and nonspecific ST-T wave abnormalities. Assessment/plan:  1. Post ablation -no symptoms to suggest recurrent atrial fibrillation of any duration. Has EP follow-up appointment in September. Continue Eliquis. 2.  Dyslipidemia -March values , HDL 53, triglycerides 85.   With his scattered luminal irregularities and ectasia will treat with Crestor 5 nightly with a lipid and liver in 2 months and instructions to stop the drug in the event of myalgias.

## 2020-11-09 RX ORDER — ROSUVASTATIN CALCIUM 5 MG/1
TABLET, COATED ORAL
Qty: 90 TABLET | Refills: 1 | Status: SHIPPED | OUTPATIENT
Start: 2020-11-09 | End: 2021-05-27

## 2020-12-08 ENCOUNTER — TELEPHONE (OUTPATIENT)
Dept: CARDIOLOGY | Age: 70
End: 2020-12-08

## 2020-12-08 NOTE — TELEPHONE ENCOUNTER
Jose Miguel Hammer, nurse with  surgery center called stating patient was about to be discharged from their care but his  ekg showed afib/aflutter  and gets down to 89 when he converts to SR. She wanted to know if patient should resume eliquis. Advised per our last note it he was still taking it. Per patient Dr. Brigid Gutierrez discontinued eliquis in September. Should patient resume taking eliquis or does he need to call Dr. Brigid Gutierrez? Please advise.

## 2020-12-09 ENCOUNTER — OFFICE VISIT (OUTPATIENT)
Dept: CARDIOLOGY | Age: 70
End: 2020-12-09
Payer: MEDICARE

## 2020-12-09 VITALS
WEIGHT: 185 LBS | HEART RATE: 78 BPM | BODY MASS INDEX: 26.48 KG/M2 | SYSTOLIC BLOOD PRESSURE: 118 MMHG | DIASTOLIC BLOOD PRESSURE: 78 MMHG | HEIGHT: 70 IN

## 2020-12-09 PROCEDURE — 0296T PR EXT ECG > 48HR TO 21 DAY RCRD W/CONECT INTL RCRD: CPT | Performed by: CLINICAL NURSE SPECIALIST

## 2020-12-09 PROCEDURE — 4040F PNEUMOC VAC/ADMIN/RCVD: CPT | Performed by: CLINICAL NURSE SPECIALIST

## 2020-12-09 PROCEDURE — G8484 FLU IMMUNIZE NO ADMIN: HCPCS | Performed by: CLINICAL NURSE SPECIALIST

## 2020-12-09 PROCEDURE — 93000 ELECTROCARDIOGRAM COMPLETE: CPT | Performed by: CLINICAL NURSE SPECIALIST

## 2020-12-09 PROCEDURE — 1036F TOBACCO NON-USER: CPT | Performed by: CLINICAL NURSE SPECIALIST

## 2020-12-09 PROCEDURE — 1123F ACP DISCUSS/DSCN MKR DOCD: CPT | Performed by: CLINICAL NURSE SPECIALIST

## 2020-12-09 PROCEDURE — 99213 OFFICE O/P EST LOW 20 MIN: CPT | Performed by: CLINICAL NURSE SPECIALIST

## 2020-12-09 PROCEDURE — 3017F COLORECTAL CA SCREEN DOC REV: CPT | Performed by: CLINICAL NURSE SPECIALIST

## 2020-12-09 PROCEDURE — G8417 CALC BMI ABV UP PARAM F/U: HCPCS | Performed by: CLINICAL NURSE SPECIALIST

## 2020-12-09 PROCEDURE — G8427 DOCREV CUR MEDS BY ELIG CLIN: HCPCS | Performed by: CLINICAL NURSE SPECIALIST

## 2020-12-09 RX ORDER — ASCORBIC ACID 500 MG
1000 TABLET ORAL DAILY
COMMUNITY
End: 2022-02-24

## 2020-12-09 ASSESSMENT — ENCOUNTER SYMPTOMS
WHEEZING: 0
VOMITING: 0
CHEST TIGHTNESS: 0
SHORTNESS OF BREATH: 0
COUGH: 0
NAUSEA: 0
FACIAL SWELLING: 0
EYE REDNESS: 0
ABDOMINAL PAIN: 0

## 2020-12-09 NOTE — PATIENT INSTRUCTIONS
Return in about 5 weeks (around 1/13/2021).    Restart Eliquis 5mg twice a day  Zio Patch 2 week monitor

## 2020-12-09 NOTE — PROGRESS NOTES
[facility], [address] [place] [address], [id number]  Phone: [phone]  Fax: [phone]    OFFICE VISIT:  2020    Audra Portillo Block III - : 1950    Reason For Visit:  Bri Candelaria is a 79 y.o. male who is here for Follow-up (patient has afib issues after and during surgery yesterday) and Atrial Fibrillation       Diagnosis Orders   1. Paroxysmal atrial fibrillation (HCC)  EKG 12 lead    [place] EXT ECG > 48HR TO 21 DAY RCRD W/CONECT INTL RCRD   2. Coronary artery disease involving native coronary artery of native heart without angina pectoris     3. Closed fracture of left hand, sequela           HPI  Patient is here for follow-up today with a history of paroxysmal atrial fibrillation status post ablation in 2020 by Dr. Cata Yoon. 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 He had outpatient surgery yesterday. Postoperatively, atrial fibrillation was noted. Patient was asked to come into the office to discuss. He has currently been off anticoagulation per Dr. Josette Szymanski recommendation after ablation. Patient states he is symptomatic if he has [place] lot\" of A. fib, but otherwise does not always notice if he has short episodes. He denies any syncope or near syncope. He states he has no awareness of palpitations and fast heart rate since yesterday. He denies chest pain or unusual dyspnea. Tomas Recio MD is PCP.   Keren Araujo has the following history as recorded in [facility]:    Patient Active Problem List    Diagnosis Date Noted    Coronary artery disease involving native coronary artery      Priority: High    Vasovagal syncope 2019    Sleep apnea 2019    History of colon polyps 2018    Family history of colon cancer 2018    Chronic GERD 2016    Gastroesophageal reflux disease with esophagitis 10/29/2015    Short-segment Ashton's esophagus 10/29/2015     Past Neg Hx     Rectal Cancer Neg Hx     Stomach Cancer Neg Hx      Social History     Tobacco Use    Smoking status: Never Smoker    Smokeless tobacco: Never Used   Substance Use Topics    Alcohol use: Yes     Comment: social      Current Outpatient Medications   Medication Sig Dispense Refill    vitamin C (ASCORBIC ACID) 500 MG tablet Take 1,000 mg by mouth daily      apixaban (ELIQUIS) 5 MG TABS tablet Take 1 tablet by mouth 2 times daily 60 tablet 5    Multiple Vitamins-Minerals (PRESERVISION AREDS 2+MULTI VIT PO) Take by mouth      Omega-3 Fatty Acids (FISH OIL) 1000 MG CAPS Take 1,000 mg by mouth daily      Probiotic Product (PROBIOTIC PO) Take by mouth      MAGNESIUM MALATE PO Take 76 mg PE by mouth daily as needed      Calcium Carbonate-Vitamin D (CALTRATE 600+D PO) Take 1 tablet by mouth daily      Cholecalciferol (VITAMIN D3) 33196 units CAPS Take by mouth once a week       fluticasone (FLONASE) 50 MCG/ACT nasal spray 2 sprays by Nasal route daily      sildenafil (REVATIO) 20 MG tablet Take 20 mg by mouth 3 4 tablets prn      levothyroxine (SYNTHROID) 50 MCG tablet Take 50 mcg by mouth Daily.  Fexofenadine HCl (ALLEGRA PO) Take by mouth as needed       rosuvastatin (CRESTOR) 5 MG tablet TAKE 1 TABLET BY MOUTH EVERY DAY AT NIGHT (Patient not taking: Reported on 12/9/2020) 90 tablet 1    Turmeric 500 MG TABS Take by mouth 2 times daily        No current facility-administered medications for this visit. Allergies: Dilaudid [fd&c blue #1 al lake-hydromorphone]    Review of Systems  Review of Systems   Constitutional: Negative for activity change, diaphoresis, fatigue, fever and unexpected weight change. HENT: Negative for facial swelling and nosebleeds. Eyes: Negative for redness and visual disturbance. Respiratory: Negative for cough, chest tightness, shortness of breath and wheezing. Cardiovascular: Negative for chest pain, palpitations and leg swelling.    Gastrointestinal: Negative for abdominal pain, nausea and vomiting. Endocrine: Negative for cold intolerance and heat intolerance. Genitourinary: Negative for dysuria and hematuria. Musculoskeletal: Negative for arthralgias and myalgias. C/o left hand pain, surgery yesterday   Skin: Negative for pallor and rash. Neurological: Negative for dizziness, seizures, syncope, weakness and light-headedness. Hematological: Does not bruise/bleed easily. Psychiatric/Behavioral: Negative for agitation. The patient is not nervous/anxious. Objective  Vital Signs - /78   Pulse 78   Ht 5' 10\" (1.778 m)   Wt 185 lb (83.9 kg)   BMI 26.54 kg/m²   Physical Exam  Vitals signs and nursing note reviewed. Constitutional:       General: He is not in acute distress. Appearance: Normal appearance. He is well-developed. He is not diaphoretic. HENT:      Head: Normocephalic and atraumatic. Right Ear: Hearing and external ear normal.      Left Ear: Hearing and external ear normal.      Nose: Nose normal.   Eyes:      General:         Right eye: No discharge. Left eye: No discharge. Pupils: Pupils are equal, round, and reactive to light. Neck:      Musculoskeletal: Neck supple. No muscular tenderness. Thyroid: No thyromegaly. Vascular: No carotid bruit or JVD. Trachea: No tracheal deviation. Cardiovascular:      Rate and Rhythm: Normal rate and regular rhythm. Heart sounds: Normal heart sounds. No murmur. No friction rub. No gallop. Pulmonary:      Effort: Pulmonary effort is normal. No respiratory distress. Breath sounds: Normal breath sounds. No wheezing or rales. Abdominal:      Palpations: Abdomen is soft. Tenderness: There is no abdominal tenderness. Musculoskeletal:         General: No swelling or deformity. Comments: Left hand cast   Skin:     General: Skin is warm and dry. Findings: No rash. Neurological:      General: No focal deficit present. Mental Status: He is alert and oriented to person, place, and time. Cranial Nerves: No cranial nerve deficit. Psychiatric:         Mood and Affect: Mood normal.         Behavior: Behavior normal.         Judgment: Judgment normal.         Data:  EKG shows normal sinus rhythm rate 78    Heart Cath 1/20  Conclusions    Nonobstructive CAD with minimal disease. Normal LV ejection fraction. Echo 12/19   Findings      Mitral Valve   Structurally normal.   Normal mitral valve leaflet mobility. Trace mitral regurgitation. Aortic Valve   Aortic valve appears to be tricuspid. Structurally normal aortic valve. Trace aortic regurgitation. Tricuspid Valve   Tricuspid valve is structurally normal.   Mild tricuspid regurgitation. Pulmonic Valve   The pulmonic valve was not well visualized. Left Atrium   Mildly dilated left atrium. Left Ventricle   Normal left ventricular size with preserved LV function and an estimated   ejection fraction of approximately 55-60%. Abnormal rhythm precludes assessment of diastolic function. No evidence of left ventricular mass or thrombus noted. Right Atrium   Normal right atrial dimension with no evidence of thrombus or mass noted. Right Ventricle   Normal right ventricular size with preserved RV function. Right ventricular systolic pressure is noted at 26 mmHg. Pericardial Effusion   No evidence of significant pericardial effusion is noted. Pleural Effusion   No evidence of pleural effusion. Miscellaneous   Aortic root is within normal limits. Assessment:     Diagnosis Orders   1. Paroxysmal atrial fibrillation (HCC)  EKG 12 lead    UT EXT ECG > 48HR TO 21 DAY RCRD W/CONECT INTL RCRD   2. Coronary artery disease involving native coronary artery of native heart without angina pectoris     3.  Closed fracture of left hand, sequela         Paroxysmal atrial fibrillation- recurrence postoperatively yesterday with hand

## 2020-12-09 NOTE — TELEPHONE ENCOUNTER
Patient notified that appt is needed and to restart eliquis. Appt scheduled for 12/9/20 at 1:45 with Lore.  Patient voiced understanding

## 2021-01-07 ENCOUNTER — TELEPHONE (OUTPATIENT)
Dept: CARDIOLOGY CLINIC | Age: 71
End: 2021-01-07

## 2021-01-07 NOTE — TELEPHONE ENCOUNTER
Patient contacted by phone with results of Zio patch heart monitor. He had a 2% burden of atrial fibrillation rate ranging . He has been intolerant to beta-blockers due to hypotension in the past was intolerant to flecainide. Eliquis is expensive and not covered by his drug plan. We discussed alternatives such as Xarelto or Coumadin. He would like to stay on Eliquis for the time being. He can check with our office for samples. We discussed stroke risk with atrial fibrillation he verbalized understanding. He has an upcoming appointment with Dr. Eduardo Roman his electrophysiologist in the next month. We will forward results to his office.   Patient may cancel appointment on 1/13/2021 and reschedule for about 2 months

## 2021-01-07 NOTE — TELEPHONE ENCOUNTER
Please cancel patient's appointment on 1/13/2021 as I discussed his results by phone. He looks like he also has a February appointment with Cristian Mendieta. This can be canceled as well.   Reschedule follow-up appointment in about 2 months

## 2021-01-08 NOTE — TELEPHONE ENCOUNTER
Faxing pat results to  in [place]. Will cancel appts and get patient rescheduled in two months.

## 2021-01-11 ENCOUNTER — HOSPITAL ENCOUNTER (OUTPATIENT)
Dept: NON INVASIVE DIAGNOSTICS | Age: 71
Discharge: HOME | End: 2021-01-11
Payer: MEDICARE

## 2021-01-11 LAB
EKG P AXIS: 68 DEGREES
EKG P-R INTERVAL: 156 MS
EKG Q-T INTERVAL: 384 MS
EKG QRS DURATION: 84 MS
EKG QTC CALCULATION (BAZETT): 392 MS
EKG T AXIS: 64 DEGREES

## 2021-01-11 PROCEDURE — 93010 ELECTROCARDIOGRAM REPORT: CPT | Performed by: INTERNAL MEDICINE

## 2021-01-11 PROCEDURE — 93005 ELECTROCARDIOGRAM TRACING: CPT | Performed by: FAMILY MEDICINE

## 2021-01-18 ENCOUNTER — IMMUNIZATION (OUTPATIENT)
Age: 71
End: 2021-01-18
Payer: MEDICARE

## 2021-01-18 DIAGNOSIS — Z23 NEED FOR VACCINATION: Primary | ICD-10-CM

## 2021-01-18 PROCEDURE — 0001A COVID-19, PFIZER VACCINE 30MCG/0.3ML DOSE: CPT | Performed by: FAMILY MEDICINE

## 2021-01-18 PROCEDURE — 91300 COVID-19, PFIZER VACCINE 30MCG/0.3ML DOSE: CPT | Performed by: FAMILY MEDICINE

## 2021-02-08 ENCOUNTER — IMMUNIZATION (OUTPATIENT)
Age: 71
End: 2021-02-08
Payer: MEDICARE

## 2021-02-08 PROCEDURE — 0002A COVID-19, PFIZER VACCINE 30MCG/0.3ML DOSE: CPT | Performed by: FAMILY MEDICINE

## 2021-02-08 PROCEDURE — 91300 COVID-19, PFIZER VACCINE 30MCG/0.3ML DOSE: CPT | Performed by: FAMILY MEDICINE

## 2021-03-09 ENCOUNTER — OFFICE VISIT (OUTPATIENT)
Dept: CARDIOLOGY CLINIC | Age: 71
End: 2021-03-09
Payer: MEDICARE

## 2021-03-09 VITALS
HEART RATE: 71 BPM | HEIGHT: 70 IN | BODY MASS INDEX: 26.2 KG/M2 | WEIGHT: 183 LBS | DIASTOLIC BLOOD PRESSURE: 70 MMHG | SYSTOLIC BLOOD PRESSURE: 112 MMHG

## 2021-03-09 DIAGNOSIS — G47.30 SLEEP APNEA, UNSPECIFIED TYPE: ICD-10-CM

## 2021-03-09 DIAGNOSIS — I25.10 CORONARY ARTERY DISEASE INVOLVING NATIVE CORONARY ARTERY OF NATIVE HEART WITHOUT ANGINA PECTORIS: ICD-10-CM

## 2021-03-09 DIAGNOSIS — I48.0 PAROXYSMAL ATRIAL FIBRILLATION (HCC): Primary | ICD-10-CM

## 2021-03-09 PROCEDURE — 99213 OFFICE O/P EST LOW 20 MIN: CPT | Performed by: CLINICAL NURSE SPECIALIST

## 2021-03-09 PROCEDURE — G8427 DOCREV CUR MEDS BY ELIG CLIN: HCPCS | Performed by: CLINICAL NURSE SPECIALIST

## 2021-03-09 PROCEDURE — G8484 FLU IMMUNIZE NO ADMIN: HCPCS | Performed by: CLINICAL NURSE SPECIALIST

## 2021-03-09 PROCEDURE — 1036F TOBACCO NON-USER: CPT | Performed by: CLINICAL NURSE SPECIALIST

## 2021-03-09 PROCEDURE — 4040F PNEUMOC VAC/ADMIN/RCVD: CPT | Performed by: CLINICAL NURSE SPECIALIST

## 2021-03-09 PROCEDURE — 1123F ACP DISCUSS/DSCN MKR DOCD: CPT | Performed by: CLINICAL NURSE SPECIALIST

## 2021-03-09 PROCEDURE — 3017F COLORECTAL CA SCREEN DOC REV: CPT | Performed by: CLINICAL NURSE SPECIALIST

## 2021-03-09 PROCEDURE — 93000 ELECTROCARDIOGRAM COMPLETE: CPT | Performed by: CLINICAL NURSE SPECIALIST

## 2021-03-09 PROCEDURE — G8417 CALC BMI ABV UP PARAM F/U: HCPCS | Performed by: CLINICAL NURSE SPECIALIST

## 2021-03-09 ASSESSMENT — ENCOUNTER SYMPTOMS
FACIAL SWELLING: 0
SHORTNESS OF BREATH: 0
WHEEZING: 0
VOMITING: 0
ABDOMINAL PAIN: 0
CHEST TIGHTNESS: 0
COUGH: 0
EYE REDNESS: 0
NAUSEA: 0

## 2021-03-09 NOTE — PROGRESS NOTES
[facility], [address] [place] [address], [id number]  Phone: [phone]  Fax: [phone]    OFFICE VISIT:  3/9/2021    Jose Beard III - : 1950    Reason For Visit:  Tova Dawkins is a 70 y.o. male who is here for Follow-up (No cardiac sx today to discuss )       Diagnosis Orders   1. Paroxysmal atrial fibrillation (HCC)  EKG 12 lead   2. Sleep apnea, unspecified type     3. Coronary artery disease involving native coronary artery of native heart without angina pectoris           HPI  Patient is here for follow-up today with a history of paroxysmal atrial fibrillation status post ablation in 2020 by Dr. Pascale Hernandez. He has a history of intolerance to beta-blocker and flecainide. Patient was noted to have some recurrence of his atrial fibrillation after a fall last year, fracturing his hand. He wore a 2-week Zio patch heart monitor that showed 2% PAF burden. He also follows with electrophysiologist, Dr. Pascale Hernandez    He has noticed no recurrence of his atrial fibrillation. He was encouraged to restart Eliquis after PAF was found on his recent heart monitor. Reviewed 2021 note from Dr. Marlin Jeffers office that concurred with continuing Eliquis. Patient denies any palpitations, fast heart rates, chest pain, unusual dyspnea. [facility] [place] [facility] this [facility] for hiking and canoe trip. He and his wife are doing lots of cardiovascular exercise to prepare and he seems to be tolerating this well     Pierce Walker MD is PCP.   Bart Marinelli has the following history as recorded in [facility]:    Patient Active Problem List    Diagnosis Date Noted    Coronary artery disease involving native coronary artery      Priority: High    Vasovagal syncope 2019    Sleep apnea 2019    History of colon polyps 2018    Family history of colon cancer 2018    Chronic GERD 2016    Gastroesophageal reflux disease with esophagitis 10/29/2015    Short-segment Ashton's esophagus 10/29/2015     Past Medical History:   Diagnosis Date    Colon polyp     GERD (gastroesophageal reflux disease)     Irregular heart beat     on occ/better now    Osteoarthritis     knee left    PAC (premature atrial contraction)     PONV (postoperative nausea and vomiting)     Seasonal allergies     Sleep apnea     chele appliance used    Thyroid disease     Unspecified sleep apnea     Vaso vagal episode      Past Surgical History:   Procedure Laterality Date    ABLATION OF DYSRHYTHMIC FOCUS  03/2020    Dr Sussy Daugherty  04/2018    partical resection of cecum    COLONOSCOPY  2004 ?     COLONOSCOPY  3/27/13    HP, isolated ulceration at TI with bx indicating moderate chronic inflammation    CYST REMOVAL  08/2016    R testicle    DIAPHRAGMATIC HERNIA REPAIR  05/17/2017    Dr. Oconnor Chroman HAND SURGERY  12/08/2020    HERNIA REPAIR Right     Inguinal    KNEE ARTHROSCOPY      NASAL SEPTUM SURGERY      OTHER SURGICAL HISTORY  05/17/2017    Yasmine Dad: TIF procedure (reflux)    PA COLONOSCOPY FLX DX W/COLLJ SPEC WHEN PFRMD N/A 8/2/2018    Dr Patterson-diverticulosis, 5 yr recall    TIPS PROCEDURE  05/19/2017    TURP      UPPER GASTROINTESTINAL ENDOSCOPY  3/27/13    GERD, biopsy pos Ashton's, neg dysplasia. biopsies neg EoE, CS    UPPER GASTROINTESTINAL ENDOSCOPY  3/2014    gastritis, esophagitis.  biopsies neg for intestinal metaplasia/dysplasia    UPPER GASTROINTESTINAL ENDOSCOPY  1/18/2016    Dr Bro Raines, 3 yr recall     Family History   Problem Relation Age of Onset    Stroke Mother     Heart Failure Mother     Diabetes Mother     Atrial Fibrillation Mother     Colon Cancer Father     Colon Polyps Father     Liver Cancer Father     Colon Cancer Maternal Grandmother     Colon Polyps Maternal Grandmother     Colon Cancer Paternal Grandfather     Colon Polyps Paternal Grandfather     Other Sister pain, nausea and vomiting. Endocrine: Negative for cold intolerance and heat intolerance. Genitourinary: Negative for dysuria and hematuria. Musculoskeletal: Negative for arthralgias and myalgias. Skin: Negative for pallor and rash. Neurological: Negative for dizziness, seizures, syncope, weakness and light-headedness. Hematological: Does not bruise/bleed easily. Psychiatric/Behavioral: Negative for agitation. The patient is not nervous/anxious. Objective  Vital Signs - /70   Pulse 71   Ht 5' 10\" (1.778 m)   Wt 183 lb (83 kg)   BMI 26.26 kg/m²   Physical Exam  Vitals signs and nursing note reviewed. Constitutional:       General: He is not in acute distress. Appearance: Normal appearance. He is well-developed. He is not diaphoretic. HENT:      Head: Normocephalic and atraumatic. Right Ear: Hearing and external ear normal.      Left Ear: Hearing and external ear normal.      Nose: Nose normal.   Eyes:      General:         Right eye: No discharge. Left eye: No discharge. Pupils: Pupils are equal, round, and reactive to light. Neck:      Musculoskeletal: Neck supple. No muscular tenderness. Thyroid: No thyromegaly. Vascular: No carotid bruit or JVD. Trachea: No tracheal deviation. Cardiovascular:      Rate and Rhythm: Normal rate and regular rhythm. Heart sounds: Normal heart sounds. No murmur. No friction rub. No gallop. Pulmonary:      Effort: Pulmonary effort is normal. No respiratory distress. Breath sounds: Normal breath sounds. No wheezing or rales. Abdominal:      Palpations: Abdomen is soft. Tenderness: There is no abdominal tenderness. Musculoskeletal:         General: No swelling or deformity. Right lower leg: No edema. Left lower leg: No edema. Skin:     General: Skin is warm and dry. Findings: No rash. Neurological:      General: No focal deficit present.       Mental Status: He is alert and oriented to person, place, and time. Cranial Nerves: No cranial nerve deficit. Psychiatric:         Mood and Affect: Mood normal.         Behavior: Behavior normal.         Judgment: Judgment normal.         Data:  EKG shows normal sinus rhythm rate 71 with a QTc interval of 0.397 ms    Heart Cath 1/20  Conclusions    Nonobstructive CAD with minimal disease.   Normal LV ejection fraction.     Echo 12/19   Findings      Mitral Valve   Structurally normal.   Normal mitral valve leaflet mobility.   Trace mitral regurgitation.      Aortic Valve   Aortic valve appears to be tricuspid.   Structurally normal aortic valve.   Trace aortic regurgitation.      Tricuspid Valve   Tricuspid valve is structurally normal.   Mild tricuspid regurgitation.      Pulmonic Valve   The pulmonic valve was not well visualized.      Left Atrium   Mildly dilated left atrium.      Left Ventricle   Normal left ventricular size with preserved LV function and an estimated   ejection fraction of approximately 55-60%.   Abnormal rhythm precludes assessment of diastolic function.   No evidence of left ventricular mass or thrombus noted.      Right Atrium   Normal right atrial dimension with no evidence of thrombus or mass noted.      Right Ventricle   Normal right ventricular size with preserved RV function.   Right ventricular systolic pressure is noted at 26 mmHg.      Pericardial Effusion   No evidence of significant pericardial effusion is noted.      Pleural Effusion   No evidence of pleural effusion.      Miscellaneous   Aortic root is within normal limits. Assessment:     Diagnosis Orders   1. Paroxysmal atrial fibrillation (HCC)  EKG 12 lead   2. Sleep apnea, unspecified type     3. Coronary artery disease involving native coronary artery of native heart without angina pectoris         Paroxysmal atrial fibrillation2% PAF burden per Zio patch heart monitor at the end of 2020. He is back on Eliquis without bleeding issues.   He he feels he is had no recurrence, however he was not symptomatic with the atrial fibrillation on his most recent heart monitor. Continue to follow with electrophysiologist, Dr. Dickerson Or he is getting new equipment to treat    CADminimal disease per heart cath 1/20. No angina    Stable cardiovascular status. No evidence of overt heart failure,angina or dysrhythmia. Plan    Orders Placed This Encounter   Procedures    EKG 12 lead     Order Specific Question:   Reason for Exam?     Answer:   Irregular heart rate     Return for Dr. Fortino Marcial, as scheduled. Call with any questionsor concerns  Follow up with Oscar Jovel MD for non cardiac problems  Report any new problems  Cardiovascular Fitness-Exercise as tolerated. Strive for 15 minutes of exercise most days of the week. Cardiac / HealthyDiet  Continue current medications as directed  Continue plan of treatment  It is always recommended that you bring your medicationsbottles with you to each visit - this is for your safety!        HARLEY Hunt

## 2021-03-09 NOTE — PATIENT INSTRUCTIONS
Return in about 6 months (around 9/9/2021) for APRN. Call for palpitations    Call with any questionsor concerns  Follow up with Luis Richardson MD for non cardiac problems  Report any new problems  Cardiovascular Fitness-Exercise as tolerated. Strive for 15 minutes of exercise most days of the week. Cardiac / HealthyDiet  Continue current medications as directed  Continue plan of treatment  It is always recommended that you bring your medicationsbottles with you to each visit - this is for your safety! Patient Education        Atrial Fibrillation: Care Instructions  Your Care Instructions     Atrial fibrillation is an irregular and often fast heartbeat. Treating this condition is important for several reasons. It can cause blood clots, which can travel from your heart to your brain and cause a stroke. If you have a fast heartbeat, you may feel lightheaded, dizzy, and weak. An irregular heartbeat can also increase your risk for heart failure. Atrial fibrillation is often the result of another heart condition, such as high blood pressure or coronary artery disease. Making changes to improve your heart condition will help you stay healthy and active. Follow-up care is a key part of your treatment and safety. Be sure to make and go to all appointments, and call your doctor if you are having problems. It's also a good idea to know your test results and keep a list of the medicines you take. How can you care for yourself at home? Medicines    · Take your medicines exactly as prescribed. Call your doctor if you think you are having a problem with your medicine. You will get more details on the specific medicines your doctor prescribes.     · If your doctor has given you a blood thinner to prevent a stroke, be sure you get instructions about how to take your medicine safely.  Blood thinners can cause serious bleeding problems.     · Do not take any vitamins, over-the-counter drugs, or herbal products without talking to your doctor first.   Lifestyle changes    · Do not smoke. Smoking can increase your chance of a stroke and heart attack. If you need help quitting, talk to your doctor about stop-smoking programs and medicines. These can increase your chances of quitting for good.     · Eat a heart-healthy diet.     · Stay at a healthy weight. Lose weight if you need to.     · Limit alcohol to 2 drinks a day for men and 1 drink a day for women. Too much alcohol can cause health problems.     · Avoid colds and flu. Get a pneumococcal vaccine shot. If you have had one before, ask your doctor whether you need another dose. Get a flu shot every year. If you must be around people with colds or flu, wash your hands often. Activity    · If your doctor recommends it, get more exercise. Walking is a good choice. Bit by bit, increase the amount you walk every day. Try for at least 30 minutes on most days of the week. You also may want to swim, bike, or do other activities. Your doctor may suggest that you join a cardiac rehabilitation program so that you can have help increasing your physical activity safely.     · Start light exercise if your doctor says it is okay. Even a small amount will help you get stronger, have more energy, and manage stress. Walking is an easy way to get exercise. Start out by walking a little more than you did in the hospital. Gradually increase the amount you walk.     · When you exercise, watch for signs that your heart is working too hard. You are pushing too hard if you cannot talk while you are exercising. If you become short of breath or dizzy or have chest pain, sit down and rest immediately.     · Check your pulse regularly. Place two fingers on the artery at the palm side of your wrist, in line with your thumb. If your heartbeat seems uneven or fast, talk to your doctor. When should you call for help? Call 911 anytime you think you may need emergency care.  For example, call if:    · You have symptoms of a heart attack. These may include:  ? Chest pain or pressure, or a strange feeling in the chest.  ? Sweating. ? Shortness of breath. ? Nausea or vomiting. ? Pain, pressure, or a strange feeling in the back, neck, jaw, or upper belly or in one or both shoulders or arms. ? Lightheadedness or sudden weakness. ? A fast or irregular heartbeat. After you call 911, the  may tell you to chew 1 adult-strength or 2 to 4 low-dose aspirin. Wait for an ambulance. Do not try to drive yourself.     · You have symptoms of a stroke. These may include:  ? Sudden numbness, tingling, weakness, or loss of movement in your face, arm, or leg, especially on only one side of your body. ? Sudden vision changes. ? Sudden trouble speaking. ? Sudden confusion or trouble understanding simple statements. ? Sudden problems with walking or balance. ? A sudden, severe headache that is different from past headaches.     · You passed out (lost consciousness). Call your doctor now or seek immediate medical care if:    · You have new or increased shortness of breath.     · You feel dizzy or lightheaded, or you feel like you may faint.     · Your heart rate becomes irregular.     · You can feel your heart flutter in your chest or skip heartbeats. Tell your doctor if these symptoms are new or worse. Watch closely for changes in your health, and be sure to contact your doctor if you have any problems. Where can you learn more? Go to [email or web address]://[email or web address].[email or web address]. org and sign in to your [employer] account. Enter U020 in the RunRev box to learn more about \"Atrial Fibrillation: Care Instructions. \"     If you do not have an account, please click on the \"Sign Up Now\" link. Current as of: December 16, 2019               Content Version: 12.6  © 9637-4726 [employer]. Care instructions adapted under license by [facility] ([facility]).  If you have questions about a medical condition or this instruction, always ask your healthcare professional. [place] [address] any warranty or liability for your use of this information.

## 2021-05-27 ENCOUNTER — TELEPHONE (OUTPATIENT)
Dept: CARDIOLOGY CLINIC | Age: 71
End: 2021-05-27

## 2021-05-27 ENCOUNTER — HOSPITAL ENCOUNTER (OUTPATIENT)
Dept: PREADMISSION TESTING | Age: 71
Discharge: HOME | End: 2021-05-31
Payer: MEDICARE

## 2021-05-27 VITALS — HEIGHT: 70 IN | WEIGHT: 180 LBS | BODY MASS INDEX: 25.77 KG/M2

## 2021-05-27 LAB
ABO/RH: NORMAL
ANION GAP SERPL CALCULATED.3IONS-SCNC: 10 MMOL/L (ref 7–19)
ANTIBODY SCREEN: NORMAL
APTT: 34.6 SEC (ref 26–36.2)
BASOPHILS ABSOLUTE: 0 K/UL (ref 0–0.2)
BASOPHILS RELATIVE PERCENT: 0.5 % (ref 0–1)
BUN BLDV-MCNC: 26 MG/DL (ref 8–23)
CALCIUM SERPL-MCNC: 9.1 MG/DL (ref 8.8–10.2)
CHLORIDE BLD-SCNC: 103 MMOL/L (ref 98–111)
CO2: 29 MMOL/L (ref 22–29)
CREAT SERPL-MCNC: 0.8 MG/DL (ref 0.5–1.2)
EOSINOPHILS ABSOLUTE: 0.2 K/UL (ref 0–0.6)
EOSINOPHILS RELATIVE PERCENT: 3.5 % (ref 0–5)
GFR AFRICAN AMERICAN: >59
GFR NON-AFRICAN AMERICAN: >60
GLUCOSE BLD-MCNC: 74 MG/DL (ref 74–109)
HCT VFR BLD CALC: 49.4 % (ref 42–52)
HEMOGLOBIN: 16.8 G/DL (ref 14–18)
IMMATURE GRANULOCYTES #: 0 K/UL
INR BLD: 1.13 (ref 0.88–1.18)
LYMPHOCYTES ABSOLUTE: 1.6 K/UL (ref 1.1–4.5)
LYMPHOCYTES RELATIVE PERCENT: 25.8 % (ref 20–40)
MCH RBC QN AUTO: 32.9 PG (ref 27–31)
MCHC RBC AUTO-ENTMCNC: 34 G/DL (ref 33–37)
MCV RBC AUTO: 96.7 FL (ref 80–94)
MONOCYTES ABSOLUTE: 0.7 K/UL (ref 0–0.9)
MONOCYTES RELATIVE PERCENT: 10.9 % (ref 0–10)
MRSA SCREEN RT-PCR: NOT DETECTED
NEUTROPHILS ABSOLUTE: 3.7 K/UL (ref 1.5–7.5)
NEUTROPHILS RELATIVE PERCENT: 59 % (ref 50–65)
PDW BLD-RTO: 13 % (ref 11.5–14.5)
PLATELET # BLD: 201 K/UL (ref 130–400)
PMV BLD AUTO: 11.4 FL (ref 9.4–12.4)
POTASSIUM SERPL-SCNC: 4.4 MMOL/L (ref 3.5–5)
PROTHROMBIN TIME: 14.5 SEC (ref 12–14.6)
RBC # BLD: 5.11 M/UL (ref 4.7–6.1)
SODIUM BLD-SCNC: 142 MMOL/L (ref 136–145)
WBC # BLD: 6.3 K/UL (ref 4.8–10.8)

## 2021-05-27 PROCEDURE — 86900 BLOOD TYPING SEROLOGIC ABO: CPT

## 2021-05-27 PROCEDURE — 87641 MR-STAPH DNA AMP PROBE: CPT

## 2021-05-27 PROCEDURE — 93005 ELECTROCARDIOGRAM TRACING: CPT | Performed by: ORTHOPAEDIC SURGERY

## 2021-05-27 PROCEDURE — 86901 BLOOD TYPING SEROLOGIC RH(D): CPT

## 2021-05-27 PROCEDURE — 85610 PROTHROMBIN TIME: CPT

## 2021-05-27 PROCEDURE — 86850 RBC ANTIBODY SCREEN: CPT

## 2021-05-27 PROCEDURE — 85025 COMPLETE CBC W/AUTO DIFF WBC: CPT

## 2021-05-27 PROCEDURE — 85730 THROMBOPLASTIN TIME PARTIAL: CPT

## 2021-05-27 PROCEDURE — 80048 BASIC METABOLIC PNL TOTAL CA: CPT

## 2021-05-27 NOTE — TELEPHONE ENCOUNTER
Date: 6/14/21    Cardiologist: Luci Limon    Procedure: right reverse total shoulder    Surgeon: Gris Ayala    Last Office Visit: 3/9/21  Reason for office visit and medical concerns addressed at this office visit: afib, cad, htn    Testing Performed and Date of Service:  1/2/20 Cath  Conclusions      Nonobstructive CAD with minimal disease. Normal LV ejection fraction. Recommendations      Medical management. Does the patient have a stent? If so, what type?  none    Current Medications: eliquis, revation, levothyroxine    Is the patient currently taking an anticoagulant?  If so, what is the diagnosis the patient has been given to warrant the need for the anticoagulant? eliquis for afib    Additional Notes: requesting to hold eliquis prior to procedure

## 2021-05-28 LAB
EKG P AXIS: 60 DEGREES
EKG P-R INTERVAL: 160 MS
EKG Q-T INTERVAL: 402 MS
EKG QRS DURATION: 84 MS
EKG QTC CALCULATION (BAZETT): 414 MS
EKG T AXIS: 37 DEGREES

## 2021-05-28 PROCEDURE — 93010 ELECTROCARDIOGRAM REPORT: CPT | Performed by: INTERNAL MEDICINE

## 2021-06-01 NOTE — PROGRESS NOTES
Cardiology read ekg/old ekg reviewed per dr. Paula Waller. Ok to proceed with anesthesia for upcoming surgery. No further orders at present.

## 2021-06-07 ENCOUNTER — TELEPHONE (OUTPATIENT)
Dept: CARDIOLOGY CLINIC | Age: 71
End: 2021-06-07

## 2021-06-07 NOTE — TELEPHONE ENCOUNTER
Let patient know I see no significant changes in his EKG done 5/27 compared to the EKG from March. Is Dr. Michelle Hollis canceling his surgery and requesting cardiac clearance?

## 2021-06-07 NOTE — TELEPHONE ENCOUNTER
Patient notified of EKG findings and he voiced understanding. You already granted clearance. He cancelled sx for today but held his eliquis nighttime last night and this morning. He has rescheduled for 6/12. He is going to resume eliquis today. Are you ok with the same directions of holding 2 days prior?

## 2021-06-07 NOTE — TELEPHONE ENCOUNTER
Left message for pt to return my call. Ok to hold eliquis 2 days prior to sx. Previously approved via cardiac clearance.

## 2021-07-12 ENCOUNTER — ANESTHESIA (OUTPATIENT)
Dept: OPERATING ROOM | Age: 71
End: 2021-07-12
Payer: MEDICARE

## 2021-07-12 ENCOUNTER — ANESTHESIA EVENT (OUTPATIENT)
Dept: OPERATING ROOM | Age: 71
End: 2021-07-12
Payer: MEDICARE

## 2021-07-12 ENCOUNTER — APPOINTMENT (OUTPATIENT)
Dept: GENERAL RADIOLOGY | Age: 71
End: 2021-07-12
Attending: ORTHOPAEDIC SURGERY
Payer: MEDICARE

## 2021-07-12 ENCOUNTER — HOSPITAL ENCOUNTER (OUTPATIENT)
Age: 71
Setting detail: OUTPATIENT SURGERY
Discharge: HOME | End: 2021-07-12
Attending: ORTHOPAEDIC SURGERY | Admitting: ORTHOPAEDIC SURGERY
Payer: MEDICARE

## 2021-07-12 VITALS
OXYGEN SATURATION: 94 % | BODY MASS INDEX: 25.77 KG/M2 | HEIGHT: 70 IN | WEIGHT: 180 LBS | SYSTOLIC BLOOD PRESSURE: 104 MMHG | TEMPERATURE: 97.2 F | HEART RATE: 68 BPM | RESPIRATION RATE: 14 BRPM | DIASTOLIC BLOOD PRESSURE: 84 MMHG

## 2021-07-12 VITALS — DIASTOLIC BLOOD PRESSURE: 72 MMHG | OXYGEN SATURATION: 93 % | SYSTOLIC BLOOD PRESSURE: 135 MMHG | TEMPERATURE: 96.1 F

## 2021-07-12 DIAGNOSIS — Z96.611 S/P REVERSE TOTAL SHOULDER ARTHROPLASTY, RIGHT: Primary | ICD-10-CM

## 2021-07-12 LAB
EKG P AXIS: 51 DEGREES
EKG P-R INTERVAL: 190 MS
EKG Q-T INTERVAL: 384 MS
EKG QRS DURATION: 76 MS
EKG QTC CALCULATION (BAZETT): 424 MS
EKG T AXIS: 41 DEGREES

## 2021-07-12 PROCEDURE — 7100000001 HC PACU RECOVERY - ADDTL 15 MIN: Performed by: ORTHOPAEDIC SURGERY

## 2021-07-12 PROCEDURE — 6370000000 HC RX 637 (ALT 250 FOR IP): Performed by: INTERNAL MEDICINE

## 2021-07-12 PROCEDURE — C9290 INJ, BUPIVACAINE LIPOSOME: HCPCS | Performed by: ANESTHESIOLOGY

## 2021-07-12 PROCEDURE — 3700000001 HC ADD 15 MINUTES (ANESTHESIA): Performed by: ORTHOPAEDIC SURGERY

## 2021-07-12 PROCEDURE — 7100000010 HC PHASE II RECOVERY - FIRST 15 MIN: Performed by: ORTHOPAEDIC SURGERY

## 2021-07-12 PROCEDURE — 93005 ELECTROCARDIOGRAM TRACING: CPT | Performed by: ANESTHESIOLOGY

## 2021-07-12 PROCEDURE — 3600000015 HC SURGERY LEVEL 5 ADDTL 15MIN: Performed by: ORTHOPAEDIC SURGERY

## 2021-07-12 PROCEDURE — 2500000003 HC RX 250 WO HCPCS: Performed by: NURSE ANESTHETIST, CERTIFIED REGISTERED

## 2021-07-12 PROCEDURE — 3209999900 FLUORO FOR SURGICAL PROCEDURES

## 2021-07-12 PROCEDURE — 2720000010 HC SURG SUPPLY STERILE: Performed by: ORTHOPAEDIC SURGERY

## 2021-07-12 PROCEDURE — 64415 NJX AA&/STRD BRCH PLXS IMG: CPT | Performed by: NURSE ANESTHETIST, CERTIFIED REGISTERED

## 2021-07-12 PROCEDURE — 7100000000 HC PACU RECOVERY - FIRST 15 MIN: Performed by: ORTHOPAEDIC SURGERY

## 2021-07-12 PROCEDURE — 6360000002 HC RX W HCPCS: Performed by: ANESTHESIOLOGY

## 2021-07-12 PROCEDURE — 2580000003 HC RX 258: Performed by: NURSE ANESTHETIST, CERTIFIED REGISTERED

## 2021-07-12 PROCEDURE — 99214 OFFICE O/P EST MOD 30 MIN: CPT | Performed by: INTERNAL MEDICINE

## 2021-07-12 PROCEDURE — 6360000002 HC RX W HCPCS: Performed by: NURSE ANESTHETIST, CERTIFIED REGISTERED

## 2021-07-12 PROCEDURE — 2500000003 HC RX 250 WO HCPCS: Performed by: ANESTHESIOLOGY

## 2021-07-12 PROCEDURE — 7100000011 HC PHASE II RECOVERY - ADDTL 15 MIN: Performed by: ORTHOPAEDIC SURGERY

## 2021-07-12 PROCEDURE — C1776 JOINT DEVICE (IMPLANTABLE): HCPCS | Performed by: ORTHOPAEDIC SURGERY

## 2021-07-12 PROCEDURE — 2709999900 HC NON-CHARGEABLE SUPPLY: Performed by: ORTHOPAEDIC SURGERY

## 2021-07-12 PROCEDURE — 3700000000 HC ANESTHESIA ATTENDED CARE: Performed by: ORTHOPAEDIC SURGERY

## 2021-07-12 PROCEDURE — 3600000005 HC SURGERY LEVEL 5 BASE: Performed by: ORTHOPAEDIC SURGERY

## 2021-07-12 PROCEDURE — 6360000002 HC RX W HCPCS: Performed by: ORTHOPAEDIC SURGERY

## 2021-07-12 DEVICE — INVERSE/REVERSE SCREW SYSTEM, 4.5-33
Type: IMPLANTABLE DEVICE | Site: SHOULDER | Status: FUNCTIONAL
Brand: INVERSE/REVERSE

## 2021-07-12 DEVICE — TM RVS BASE PLT 15MM POS: Type: IMPLANTABLE DEVICE | Site: SHOULDER | Status: FUNCTIONAL

## 2021-07-12 DEVICE — INVERSE/REVERSE SCREW SYSTEM, 4.5-36
Type: IMPLANTABLE DEVICE | Site: SHOULDER | Status: FUNCTIONAL
Brand: INVERSE/REVERSE

## 2021-07-12 DEVICE — TM REVERSE 40MM GLENOSPHERE: Type: IMPLANTABLE DEVICE | Site: SHOULDER | Status: FUNCTIONAL

## 2021-07-12 DEVICE — TM REVERSE 40MM POLY LINER +6MM: Type: IMPLANTABLE DEVICE | Site: SHOULDER | Status: FUNCTIONAL

## 2021-07-12 DEVICE — TM REVERSE STEM 12MM X 130MM: Type: IMPLANTABLE DEVICE | Site: SHOULDER | Status: FUNCTIONAL

## 2021-07-12 RX ORDER — METOPROLOL TARTRATE 5 MG/5ML
INJECTION INTRAVENOUS PRN
Status: DISCONTINUED | OUTPATIENT
Start: 2021-07-12 | End: 2021-07-12 | Stop reason: SDUPTHER

## 2021-07-12 RX ORDER — MEPERIDINE HYDROCHLORIDE 50 MG/ML
12.5 INJECTION INTRAMUSCULAR; INTRAVENOUS; SUBCUTANEOUS EVERY 5 MIN PRN
Status: DISCONTINUED | OUTPATIENT
Start: 2021-07-12 | End: 2021-07-12 | Stop reason: HOSPADM

## 2021-07-12 RX ORDER — SODIUM CHLORIDE, SODIUM LACTATE, POTASSIUM CHLORIDE, CALCIUM CHLORIDE 600; 310; 30; 20 MG/100ML; MG/100ML; MG/100ML; MG/100ML
INJECTION, SOLUTION INTRAVENOUS CONTINUOUS PRN
Status: DISCONTINUED | OUTPATIENT
Start: 2021-07-12 | End: 2021-07-12 | Stop reason: SDUPTHER

## 2021-07-12 RX ORDER — SODIUM CHLORIDE 0.9 % (FLUSH) 0.9 %
5-40 SYRINGE (ML) INJECTION EVERY 12 HOURS SCHEDULED
Status: DISCONTINUED | OUTPATIENT
Start: 2021-07-12 | End: 2021-07-12 | Stop reason: HOSPADM

## 2021-07-12 RX ORDER — HYDROMORPHONE HYDROCHLORIDE 1 MG/ML
0.25 INJECTION, SOLUTION INTRAMUSCULAR; INTRAVENOUS; SUBCUTANEOUS EVERY 5 MIN PRN
Status: DISCONTINUED | OUTPATIENT
Start: 2021-07-12 | End: 2021-07-12

## 2021-07-12 RX ORDER — DIPHENHYDRAMINE HYDROCHLORIDE 50 MG/ML
12.5 INJECTION INTRAMUSCULAR; INTRAVENOUS
Status: DISCONTINUED | OUTPATIENT
Start: 2021-07-12 | End: 2021-07-12 | Stop reason: HOSPADM

## 2021-07-12 RX ORDER — MORPHINE SULFATE 4 MG/ML
4 INJECTION, SOLUTION INTRAMUSCULAR; INTRAVENOUS EVERY 5 MIN PRN
Status: DISCONTINUED | OUTPATIENT
Start: 2021-07-12 | End: 2021-07-12 | Stop reason: HOSPADM

## 2021-07-12 RX ORDER — FENTANYL CITRATE 50 UG/ML
INJECTION, SOLUTION INTRAMUSCULAR; INTRAVENOUS PRN
Status: DISCONTINUED | OUTPATIENT
Start: 2021-07-12 | End: 2021-07-12 | Stop reason: SDUPTHER

## 2021-07-12 RX ORDER — HYDROMORPHONE HYDROCHLORIDE 1 MG/ML
0.5 INJECTION, SOLUTION INTRAMUSCULAR; INTRAVENOUS; SUBCUTANEOUS EVERY 5 MIN PRN
Status: DISCONTINUED | OUTPATIENT
Start: 2021-07-12 | End: 2021-07-12

## 2021-07-12 RX ORDER — LABETALOL HYDROCHLORIDE 5 MG/ML
5 INJECTION, SOLUTION INTRAVENOUS EVERY 10 MIN PRN
Status: DISCONTINUED | OUTPATIENT
Start: 2021-07-12 | End: 2021-07-12 | Stop reason: HOSPADM

## 2021-07-12 RX ORDER — ROCURONIUM BROMIDE 10 MG/ML
INJECTION, SOLUTION INTRAVENOUS PRN
Status: DISCONTINUED | OUTPATIENT
Start: 2021-07-12 | End: 2021-07-12 | Stop reason: SDUPTHER

## 2021-07-12 RX ORDER — ONDANSETRON 4 MG/1
4 TABLET, FILM COATED ORAL DAILY PRN
Qty: 20 TABLET | Refills: 0 | Status: SHIPPED | OUTPATIENT
Start: 2021-07-12 | End: 2022-02-24 | Stop reason: SDUPTHER

## 2021-07-12 RX ORDER — SODIUM CHLORIDE 9 MG/ML
25 INJECTION, SOLUTION INTRAVENOUS PRN
Status: DISCONTINUED | OUTPATIENT
Start: 2021-07-12 | End: 2021-07-12 | Stop reason: HOSPADM

## 2021-07-12 RX ORDER — OXYCODONE AND ACETAMINOPHEN 10; 325 MG/1; MG/1
1 TABLET ORAL EVERY 4 HOURS PRN
Qty: 42 TABLET | Refills: 0 | Status: SHIPPED | OUTPATIENT
Start: 2021-07-12 | End: 2021-07-19

## 2021-07-12 RX ORDER — ONDANSETRON 4 MG/1
4 TABLET, FILM COATED ORAL DAILY PRN
Qty: 20 TABLET | Refills: 0 | Status: SHIPPED | OUTPATIENT
Start: 2021-07-12

## 2021-07-12 RX ORDER — MORPHINE SULFATE 4 MG/ML
2 INJECTION, SOLUTION INTRAMUSCULAR; INTRAVENOUS EVERY 5 MIN PRN
Status: DISCONTINUED | OUTPATIENT
Start: 2021-07-12 | End: 2021-07-12 | Stop reason: HOSPADM

## 2021-07-12 RX ORDER — MORPHINE SULFATE 4 MG/ML
4 INJECTION, SOLUTION INTRAMUSCULAR; INTRAVENOUS
Status: DISCONTINUED | OUTPATIENT
Start: 2021-07-12 | End: 2021-07-12 | Stop reason: HOSPADM

## 2021-07-12 RX ORDER — BUPIVACAINE HYDROCHLORIDE 5 MG/ML
INJECTION, SOLUTION EPIDURAL; INTRACAUDAL
Status: COMPLETED | OUTPATIENT
Start: 2021-07-12 | End: 2021-07-12

## 2021-07-12 RX ORDER — SODIUM CHLORIDE, SODIUM LACTATE, POTASSIUM CHLORIDE, CALCIUM CHLORIDE 600; 310; 30; 20 MG/100ML; MG/100ML; MG/100ML; MG/100ML
INJECTION, SOLUTION INTRAVENOUS CONTINUOUS
Status: DISCONTINUED | OUTPATIENT
Start: 2021-07-12 | End: 2021-07-12 | Stop reason: HOSPADM

## 2021-07-12 RX ORDER — LIDOCAINE HYDROCHLORIDE 10 MG/ML
INJECTION, SOLUTION EPIDURAL; INFILTRATION; INTRACAUDAL; PERINEURAL PRN
Status: DISCONTINUED | OUTPATIENT
Start: 2021-07-12 | End: 2021-07-12 | Stop reason: SDUPTHER

## 2021-07-12 RX ORDER — MIDAZOLAM HYDROCHLORIDE 1 MG/ML
2 INJECTION INTRAMUSCULAR; INTRAVENOUS
Status: COMPLETED | OUTPATIENT
Start: 2021-07-12 | End: 2021-07-12

## 2021-07-12 RX ORDER — FENTANYL CITRATE 50 UG/ML
50 INJECTION, SOLUTION INTRAMUSCULAR; INTRAVENOUS
Status: DISCONTINUED | OUTPATIENT
Start: 2021-07-12 | End: 2021-07-12 | Stop reason: HOSPADM

## 2021-07-12 RX ORDER — METOCLOPRAMIDE HYDROCHLORIDE 5 MG/ML
10 INJECTION INTRAMUSCULAR; INTRAVENOUS
Status: COMPLETED | OUTPATIENT
Start: 2021-07-12 | End: 2021-07-12

## 2021-07-12 RX ORDER — EPHEDRINE SULFATE 50 MG/ML
INJECTION, SOLUTION INTRAVENOUS PRN
Status: DISCONTINUED | OUTPATIENT
Start: 2021-07-12 | End: 2021-07-12 | Stop reason: SDUPTHER

## 2021-07-12 RX ORDER — PROMETHAZINE HYDROCHLORIDE 25 MG/ML
6.25 INJECTION, SOLUTION INTRAMUSCULAR; INTRAVENOUS
Status: DISCONTINUED | OUTPATIENT
Start: 2021-07-12 | End: 2021-07-12 | Stop reason: HOSPADM

## 2021-07-12 RX ORDER — DEXAMETHASONE SODIUM PHOSPHATE 10 MG/ML
INJECTION, SOLUTION INTRAMUSCULAR; INTRAVENOUS PRN
Status: DISCONTINUED | OUTPATIENT
Start: 2021-07-12 | End: 2021-07-12 | Stop reason: SDUPTHER

## 2021-07-12 RX ORDER — ONDANSETRON 2 MG/ML
INJECTION INTRAMUSCULAR; INTRAVENOUS PRN
Status: DISCONTINUED | OUTPATIENT
Start: 2021-07-12 | End: 2021-07-12 | Stop reason: SDUPTHER

## 2021-07-12 RX ORDER — PROPOFOL 10 MG/ML
INJECTION, EMULSION INTRAVENOUS PRN
Status: DISCONTINUED | OUTPATIENT
Start: 2021-07-12 | End: 2021-07-12 | Stop reason: SDUPTHER

## 2021-07-12 RX ORDER — LIDOCAINE HYDROCHLORIDE 10 MG/ML
1 INJECTION, SOLUTION EPIDURAL; INFILTRATION; INTRACAUDAL; PERINEURAL
Status: DISCONTINUED | OUTPATIENT
Start: 2021-07-12 | End: 2021-07-12 | Stop reason: HOSPADM

## 2021-07-12 RX ORDER — SCOLOPAMINE TRANSDERMAL SYSTEM 1 MG/1
1 PATCH, EXTENDED RELEASE TRANSDERMAL ONCE
Status: DISCONTINUED | OUTPATIENT
Start: 2021-07-12 | End: 2021-07-12 | Stop reason: HOSPADM

## 2021-07-12 RX ORDER — SODIUM CHLORIDE 0.9 % (FLUSH) 0.9 %
5-40 SYRINGE (ML) INJECTION PRN
Status: DISCONTINUED | OUTPATIENT
Start: 2021-07-12 | End: 2021-07-12 | Stop reason: HOSPADM

## 2021-07-12 RX ORDER — BUPIVACAINE HYDROCHLORIDE 5 MG/ML
INJECTION, SOLUTION EPIDURAL; INTRACAUDAL
Status: COMPLETED
Start: 2021-07-12 | End: 2021-07-12

## 2021-07-12 RX ORDER — HYDRALAZINE HYDROCHLORIDE 20 MG/ML
5 INJECTION INTRAMUSCULAR; INTRAVENOUS EVERY 10 MIN PRN
Status: DISCONTINUED | OUTPATIENT
Start: 2021-07-12 | End: 2021-07-12 | Stop reason: HOSPADM

## 2021-07-12 RX ADMIN — PROPOFOL 30 MG: 10 INJECTION, EMULSION INTRAVENOUS at 11:38

## 2021-07-12 RX ADMIN — SUGAMMADEX 200 MG: 100 INJECTION, SOLUTION INTRAVENOUS at 11:16

## 2021-07-12 RX ADMIN — BUPIVACAINE HYDROCHLORIDE 10 ML: 5 INJECTION, SOLUTION EPIDURAL; INTRACAUDAL; PERINEURAL at 09:41

## 2021-07-12 RX ADMIN — METOPROLOL TARTRATE 5 MG: 5 INJECTION, SOLUTION INTRAVENOUS at 09:59

## 2021-07-12 RX ADMIN — DEXAMETHASONE SODIUM PHOSPHATE 10 MG: 10 INJECTION, SOLUTION INTRAMUSCULAR; INTRAVENOUS at 10:13

## 2021-07-12 RX ADMIN — METOPROLOL TARTRATE 25 MG: 25 TABLET, FILM COATED ORAL at 09:05

## 2021-07-12 RX ADMIN — FENTANYL CITRATE 100 MCG: 50 INJECTION, SOLUTION INTRAMUSCULAR; INTRAVENOUS at 09:49

## 2021-07-12 RX ADMIN — SODIUM CHLORIDE, SODIUM LACTATE, POTASSIUM CHLORIDE, AND CALCIUM CHLORIDE: 600; 310; 30; 20 INJECTION, SOLUTION INTRAVENOUS at 10:27

## 2021-07-12 RX ADMIN — FENTANYL CITRATE 25 MCG: 50 INJECTION, SOLUTION INTRAMUSCULAR; INTRAVENOUS at 11:39

## 2021-07-12 RX ADMIN — PROPOFOL 150 MG: 10 INJECTION, EMULSION INTRAVENOUS at 09:50

## 2021-07-12 RX ADMIN — LIDOCAINE HYDROCHLORIDE 50 MG: 10 INJECTION, SOLUTION EPIDURAL; INFILTRATION; INTRACAUDAL; PERINEURAL at 09:49

## 2021-07-12 RX ADMIN — EPHEDRINE SULFATE 10 MG: 50 INJECTION INTRAMUSCULAR; INTRAVENOUS; SUBCUTANEOUS at 10:26

## 2021-07-12 RX ADMIN — EPHEDRINE SULFATE 10 MG: 50 INJECTION INTRAMUSCULAR; INTRAVENOUS; SUBCUTANEOUS at 11:11

## 2021-07-12 RX ADMIN — SODIUM CHLORIDE, SODIUM LACTATE, POTASSIUM CHLORIDE, AND CALCIUM CHLORIDE: 600; 310; 30; 20 INJECTION, SOLUTION INTRAVENOUS at 09:45

## 2021-07-12 RX ADMIN — ROCURONIUM BROMIDE 50 MG: 10 INJECTION, SOLUTION INTRAVENOUS at 09:51

## 2021-07-12 RX ADMIN — EPHEDRINE SULFATE 10 MG: 50 INJECTION INTRAMUSCULAR; INTRAVENOUS; SUBCUTANEOUS at 10:46

## 2021-07-12 RX ADMIN — BUPIVACAINE 10 ML: 13.3 INJECTION, SUSPENSION, LIPOSOMAL INFILTRATION at 09:41

## 2021-07-12 RX ADMIN — EPHEDRINE SULFATE 10 MG: 50 INJECTION INTRAMUSCULAR; INTRAVENOUS; SUBCUTANEOUS at 10:15

## 2021-07-12 RX ADMIN — Medication 2000 MG: at 10:08

## 2021-07-12 RX ADMIN — ONDANSETRON HYDROCHLORIDE 4 MG: 2 INJECTION, SOLUTION INTRAMUSCULAR; INTRAVENOUS at 11:14

## 2021-07-12 RX ADMIN — MIDAZOLAM 1 MG: 1 INJECTION INTRAMUSCULAR; INTRAVENOUS at 09:09

## 2021-07-12 RX ADMIN — METOCLOPRAMIDE 10 MG: 5 INJECTION, SOLUTION INTRAMUSCULAR; INTRAVENOUS at 12:13

## 2021-07-12 ASSESSMENT — ENCOUNTER SYMPTOMS
SHORTNESS OF BREATH: 0
GASTROINTESTINAL NEGATIVE: 1
RESPIRATORY NEGATIVE: 1

## 2021-07-12 ASSESSMENT — PAIN SCALES - GENERAL
PAINLEVEL_OUTOF10: 0
PAINLEVEL_OUTOF10: 0

## 2021-07-12 ASSESSMENT — LIFESTYLE VARIABLES: SMOKING_STATUS: 0

## 2021-07-12 NOTE — PROGRESS NOTES
Dr. Thalia Velazquez at bedside to review patients vitals and to give the okay to transfer to op care.

## 2021-07-12 NOTE — ANESTHESIA PRE PROCEDURE
Department of Anesthesiology  Preprocedure Note       Name:  Roberta Hays   Age:  70 y.o.  :  1950                                          MRN:  [id number]         Date:  2021      Surgeon: Matthew Levin):  Brandie Nieves MD    Procedure: Procedure(s):  RIGHT REVERSE SHOULDER TOTAL ARTHROPLASTY    Medications prior to admission:   Prior to Admission medications    Medication Sig Start Date End Date Taking? Authorizing Provider   apixaban (ELIQUIS) 5 MG TABS tablet Take 5 mg by mouth 2 times daily   Yes Historical Provider, MD   vitamin C (ASCORBIC ACID) 500 MG tablet Take 1,000 mg by mouth daily   Yes Historical Provider, MD   Multiple Vitamins-Minerals (PRESERVISION AREDS 2+MULTI VIT PO) Take 1 tablet by mouth daily    Yes Historical Provider, MD   Omega-3 Fatty Acids (FISH OIL) 1000 MG CAPS Take 1,000 mg by mouth daily as needed    Yes Historical Provider, MD   MAGNESIUM MALATE PO Take 76 mg PE by mouth daily as needed   Yes Historical Provider, MD   Calcium Carbonate-Vitamin D (CALTRATE 600+D PO) Take 1 tablet by mouth daily   Yes Historical Provider, MD   Cholecalciferol (VITAMIN D3) 01663 units CAPS Take 1 capsule by mouth once a week    Yes Historical Provider, MD   fluticasone (FLONASE) 50 MCG/ACT nasal spray 2 sprays by Nasal route daily   Yes Historical Provider, MD   sildenafil (REVATIO) 20 MG tablet Take 20 mg by mouth 3 4 tablets prn   Yes Historical Provider, MD   levothyroxine (SYNTHROID) 50 MCG tablet Take 50 mcg by mouth Daily. Yes Historical Provider, MD       Current medications:    Current Facility-Administered Medications   Medication Dose Route Frequency Provider Last Rate Last Admin    ceFAZolin (ANCEF) 2,000 mg in sterile water 20 mL IV syringe  2,000 mg Intravenous Once Brandie Nieves MD           Allergies:     Allergies   Allergen Reactions    Dilaudid [Fd&C Blue #1 Al Aly-Hydromorphone] Nausea Only       Problem List:    Patient Active Problem List   Diagnosis Code    Gastroesophageal reflux disease with esophagitis K21.00    Short-segment Ashton's esophagus K22.70    Chronic GERD K21.9    History of colon polyps Z86.010    Family history of colon cancer Z80.0    Vasovagal syncope R55    Sleep apnea G47.30    Coronary artery disease involving native coronary artery I25.10       Past Medical History:        Diagnosis Date    Atrial fibrillation (HCC)     Colon polyp     GERD (gastroesophageal reflux disease)     Irregular heart beat     on occ/better now    Osteoarthritis     knee left    PAC (premature atrial contraction)     PONV (postoperative nausea and vomiting)     Seasonal allergies     Sleep apnea     chele appliance used    Thyroid disease     Unspecified sleep apnea     Vaso vagal episode        Past Surgical History:        Procedure Laterality Date    ABLATION OF DYSRHYTHMIC FOCUS  03/2020    Dr Willams Sites  04/2018    partical resection of cecum    COLONOSCOPY  2004 ?     COLONOSCOPY  3/27/13    HP, isolated ulceration at TI with bx indicating moderate chronic inflammation    CYST REMOVAL  08/2016    R testicle    DIAPHRAGMATIC HERNIA REPAIR  05/17/2017    Dr. Renea Maldonado HAND SURGERY  12/08/2020    HERNIA REPAIR Right     Inguinal    KNEE ARTHROSCOPY      NASAL SEPTUM SURGERY      OTHER SURGICAL HISTORY  05/17/2017    Magdalena Pew: TIF procedure (reflux)    [place] COLONOSCOPY FLX DX W/COLLJ SPEC WHEN PFRMD N/A 8/2/2018    Dr Patterson-diverticulosis, 5 yr recall    TIPS PROCEDURE  05/19/2017    TURP      UPPER GASTROINTESTINAL ENDOSCOPY  3/27/13    GERD, biopsy pos Ashton's, neg dysplasia. biopsies neg EoE, CS    UPPER GASTROINTESTINAL ENDOSCOPY  3/2014    gastritis, esophagitis.  biopsies neg for intestinal metaplasia/dysplasia    UPPER GASTROINTESTINAL ENDOSCOPY  1/18/2016    Dr Betito Arrieta, 3 yr recall       Social History:    Social History     Tobacco Use    Smoking status: Never Smoker    Smokeless tobacco: Never Used   Substance Use Topics    Alcohol use: Yes     Comment: social                                Counseling given: Not Answered      Vital Signs (Current):   Vitals:    07/06/21 0920   Weight: 180 lb (81.6 kg)   Height: 5' 10\" (1.778 m)                                              BP Readings from Last 3 Encounters:   03/09/21 112/70   12/09/20 118/78   08/18/20 124/64       NPO Status:                                                                                 BMI:   Wt Readings from Last 3 Encounters:   07/06/21 180 lb (81.6 kg)   07/06/21 180 lb (81.6 kg)   05/27/21 180 lb (81.6 kg)     Body mass index is 25.83 kg/m². CBC:   Lab Results   Component Value Date    WBC 6.3 05/27/2021    RBC 5.11 05/27/2021    HGB 16.8 05/27/2021    HCT 49.4 05/27/2021    MCV 96.7 05/27/2021    RDW 13.0 05/27/2021     05/27/2021       CMP:   Lab Results   Component Value Date     05/27/2021    K 4.4 05/27/2021     05/27/2021    CO2 29 05/27/2021    BUN 26 05/27/2021    CREATININE 0.8 05/27/2021    GFRAA >59 05/27/2021    LABGLOM >60 05/27/2021    GLUCOSE 74 05/27/2021    PROT 6.5 01/02/2020    CALCIUM 9.1 05/27/2021    BILITOT 0.5 01/02/2020    ALKPHOS 58 01/02/2020    AST 18 01/02/2020    ALT 16 01/02/2020       POC Tests: No results for input(s): POCGLU, POCNA, POCK, POCCL, POCBUN, POCHEMO, POCHCT in the last 72 hours.     Coags:   Lab Results   Component Value Date    PROTIME 14.5 05/27/2021    INR 1.13 05/27/2021    APTT 34.6 05/27/2021       HCG (If Applicable): No results found for: PREGTESTUR, PREGSERUM, HCG, HCGQUANT     ABGs: No results found for: PHART, PO2ART, YQE8VNN, TUK4DMB, BEART, I0DJZQPN     Type & Screen (If Applicable):  No results found for: LABABO, LABRH    Drug/Infectious Status (If Applicable):  No results found for: HIV, HEPCAB    COVID-19 Screening (If Applicable): No results found for: COVID19        Anesthesia Evaluation  Patient summary reviewed and Nursing notes reviewed   history of anesthetic complications: PONV. Airway: Mallampati: II  TM distance: >3 FB   Neck ROM: full  Mouth opening: > = 3 FB Dental: normal exam         Pulmonary:Negative Pulmonary ROS and normal exam  breath sounds clear to auscultation  (+) sleep apnea:      (-) shortness of breath and not a current smoker          Patient did not smoke on day of surgery. Cardiovascular:    (+) CAD:, dysrhythmias (3/20 ablation ): atrial fibrillation,     (-) hypertension,  angina and  CHF    NYHA Classification: I  ECG reviewed  Rhythm: regular  Rate: normal           Beta Blocker:  Not on Beta Blocker      ROS comment: Findings      Mitral Valve   Structurally normal.   Normal mitral valve leaflet mobility. Trace mitral regurgitation. Aortic Valve   Aortic valve appears to be tricuspid. Structurally normal aortic valve. Trace aortic regurgitation. Tricuspid Valve   Tricuspid valve is structurally normal.   Mild tricuspid regurgitation. Pulmonic Valve   The pulmonic valve was not well visualized. Left Atrium   Mildly dilated left atrium. Left Ventricle   Normal left ventricular size with preserved LV function and an estimated   ejection fraction of approximately 55-60%. Abnormal rhythm precludes assessment of diastolic function. No evidence of left ventricular mass or thrombus noted. Right Atrium   Normal right atrial dimension with no evidence of thrombus or mass noted. Right Ventricle   Normal right ventricular size with preserved RV function. Right ventricular systolic pressure is noted at 26 mmHg. Pericardial Effusion   No evidence of significant pericardial effusion is noted. Pleural Effusion   No evidence of pleural effusion. Miscellaneous   Aortic root is within normal limits.      M-Mode Measurements (cm)      LVIDd: 4.09 cm                       LVIDs: 2.8 cm   IVSd: 0.93 cm   LVPWd: 0.84 cm                       AO Root Dimension: 2.2 cm   % Ejection Fraction: 55 %            LA: 3.5 cm                                        LVOT: 2.1 cm     Doppler Measurements:      AV Peak Gradient: 2.64 mmHg        MV Peak E-Wave: 78.6 cm/s      TR Velocity:176 cm/s               MV Peak Gradient:      Neuro/Psych:   Negative Neuro/Psych ROS     (-) seizures, CVA and depression/anxiety            GI/Hepatic/Renal: Neg GI/Hepatic/Renal ROS  (+) GERD:,      (-) hiatal hernia       Endo/Other: Negative Endo/Other ROS   (+) blood dyscrasia: anticoagulation therapy:., .          Pt had PAT visit. Abdominal:       Abdomen: soft. Vascular: Other Findings:             Anesthesia Plan      general and regional     ASA 3     (Iv zofran within 30 min of closing )  Induction: intravenous. BIS  MIPS: Postoperative opioids intended and Prophylactic antiemetics administered. Anesthetic plan and risks discussed with patient. Use of blood products discussed with patient whom. Plan discussed with CRNA.     Attending anesthesiologist reviewed and agrees with Pre Eval content              Kiki Severe, MD   7/12/2021

## 2021-07-12 NOTE — OP NOTE
Operative Note      Patient: Frank Perez III  YOB: 1950  MRN: [id number]    Date of Procedure: 7/12/2021    OPERATIVE NOTE    PREOPERATIVE DIAGNOSIS:   1.) RIGHT PRIMARY GLENOHUMERAL OSTEOARTHRITIS   2.) RIGHT ROTATOR CUFF TEAR ARTHROPATHY    POSTOPERATIVE DIAGNOSIS:    1.) PRIMARY GLENOHUMERAL OSTEOARTHRITIS    2.) RIGHT ROTATOR CUFF TEAR ARTHROPATHY    PROCEDURE:  Procedure(s):   1.) RIGHT REVERSE TOTAL SHOULDER ARTHROPLASTY     SURGEON:  Paul Gee MD    ASSISTANT:  Misti Birmingham PA-C    The assistant aided in limb position as well as retractor placement.  The assistant was also critical in implantation of the prosthesis.  In addition to this, the assistant was responsible for wound closure.  It was necessary to have the assistant present in order to complete the procedure. ANESTHESIA:  General    ESTIMATED BLOOD LOSS:  200cc. COMPLICATIONS:  None. CONDITION:  Stable. IMPLANTS:  Park: 12 x 130 mm stem, 40 mm glenosphere, 6 polyethylene spacer    Indications:  69 yo patient presents for a reverse TSA for primary glenohumeral osteoarthritis and a rotator cuff tear arthropathy. The Patient has failed conservative management including: activity modifications, NSAIDs, therapy and time. DESCRIPTION OF PROCEDURE   The patient was interviewed in the preanesthesia area, where her shoulder was  marked with a marking pen. She was then administered an ultrasound-guided  regional block per the anesthesia team.    The patient was taken to the operative suite, where general endotracheal anesthesia was performed by the anesthesia team. A timeout was then called to confirm the patient and the operative site as well as the planned procedure and administration 1 gm Ancef. The patient was then positioned in the beach chair position and prepped and draped in standard sterile fashion using ChloraPrep.        A standard deltopectoral incision was carried out to the shoulder, and the  cephalic vein and deltoid were retracted laterally. An [employer] was then  placed medially beneath the conjoined tendon. The biceps tendon was then  carefully dissected out and tenotomized at the level of the pectoralis major. The biceps was then followed into the joint. The subscapularis was then  taken down anteriorly as the arm was carefully externally rotated and  dislocated from the shoulder. A starter awl was then started at the highest  point on the humeral head as we sequentially reamed up to a size 12 x 130 stem. The extramedullary cutting guide was then placed in about 25 degrees of retroversion. We cut off the top of the guide. The humerus was then finished proximally with a box reamer and conical reamer and a 12 x 130 was then placed. A Fukuda retractor was then placed posteriorly with an anterior neck  retractor anteriorly. An inferior capsulotomy was then performed. The  superior biceps complex as well as the labrum were then removed in their  entirety. The glenoid was then carefully dissected out. A glenoid scraper  was used to remove the intraarticular cartilage. The half moon guide was then  used to localize our guidepin in the center of the glenoid. We then drilled  over the guidepin with a cannulated drill bit. A hand-held reamer was then  used to hand-ream the glenoid down to bleeding bone. A second drill bit was  then used to dilate the central hole. We then placed a trabecular metal  baseplate slightly rotated with the superior screw hole aimed towards the  coracoid base, and a second screw hole was aimed down the scapular column. The baseplate was then fixed with two 4.5 mm cancellous screws. Once this was completed, we then placed a 40  mm glenosphere. Attention was turned back to the humerus, where a definitive 12 x 130 stem  was then placed. The shoulder was then reduced and trialed with a 6 trial spacer. I felt the 6 mmspacer gave us the most stable construct.  The patient had normal tension across the conjoined tendon in the deltoid. There was no shuck, and the shoulder was stable throughout an arc of motion. Intraoperative fluoroscopy obtained confirmed the position of our components. At this point in time, the trial spacer was removed. The shoulder was  then copiously irrigated with pulsatile lavage. I then placed a 6 mm polyethylene spacer and reduced the shoulder. It was once again found to be stable. Meticulous hemostasis was maintained with electrocautery. The wound was then closed with Vicryl suture as well a a Prineo wound closure system. The patient awoke from anesthesia without difficulty and wastransferred to the PACU in stable condition.  All sponge, needle, and  instrument counts were correct at the end of the procedure.       ________________________________  Isabel Leblanc MD    Electronically signed by Isabel Leblanc MD on 7/12/2021 at 1:08 PM

## 2021-07-12 NOTE — ANESTHESIA PROCEDURE NOTES
Peripheral Block    Patient location during procedure: pre-op  Start time: 7/12/2021 9:41 AM  End time: 7/12/2021 9:41 AM  Staffing  Performed: anesthesiologist   Anesthesiologist: Alla Rodriguez MD  Preanesthetic Checklist  Completed: patient identified, IV checked, site marked, risks and benefits discussed, surgical consent, monitors and equipment checked, pre-op evaluation, timeout performed, anesthesia consent given, oxygen available and patient being monitored  Peripheral Block  Patient position: supine  Prep: ChloraPrep  Patient monitoring: cardiac monitor, continuous pulse ox, frequent blood pressure checks and IV access  Block type: Brachial plexus  Laterality: right  Injection technique: single-shot  Guidance: ultrasound guided  Provider prep: mask and sterile gloves  Needle  Needle type: short-bevel   Needle gauge: 22 G  Needle length: 5 cm  Needle localization: anatomical landmarks and ultrasound guidance  Assessment  Injection assessment: negative aspiration for heme, no paresthesia on injection and local visualized surrounding nerve on ultrasound  Paresthesia pain: none  Slow fractionated injection: yes  Hemodynamics: stable  Additional Notes  Needle was introduced aprroximately the C5-C6 region and using a inplane imaging technique the needle was directed thru the middle scalene muscle and brought to bear adjacent to the brachial plexus. Needle advancement was under direct vision at all times . Local Anesthesia was injected and all vascular structures were avoided. The local anesthetic hydrodissected with half the dose deposited on lateral side of the plexus, and other half was deposited on the medial half of the brachial plexus. Total dose was 10cc of 1.3% Exparel and 10cc 0.5% Bupivicaine  The plexus appeared anatomically normal and no obvious pathology was noted.      Medications Administered  Bupivacaine liposome (EXPAREL) injection 1.3%, 10 mL  bupivacaine (MARCAINE) PF injection 0.5%, 10 mL  Reason for block: post-op pain management

## 2021-07-12 NOTE — BRIEF OP NOTE
Brief Postoperative Note      Patient: Cecelia Lombardo III  YOB: 1950  MRN: [id number]    Date of Procedure: 7/12/2021    Pre-Op Diagnosis:  Right shoulder rotator cuff tear arthropathy    Post-Op Diagnosis: Right shoulder rotator cuff tear arthropathy       Procedure(s):  RIGHT REVERSE SHOULDER TOTAL ARTHROPLASTY    Surgeon(s):  Odalys Valencia MD    Assistant:   Assistant: Lazarus Larsson  Physician Assistant: Garland England PA-C    Anesthesia: General    Estimated Blood Loss (mL): Minimal    Complications: None        Electronically signed by Odalys Valencia MD on 7/12/2021 at 10:05 AM

## 2021-07-12 NOTE — H&P
Pt Name: Sonny Kline  MRN: [id number]  YOB: 1950  Date: 7/12/2021      HPI: 69 yo male presents for a right reverse TSA. Past Medical/Surgical History:   Past Medical History:   Diagnosis Date    Atrial fibrillation (HCC)     Colon polyp     GERD (gastroesophageal reflux disease)     Irregular heart beat     on occ/better now    Osteoarthritis     knee left    PAC (premature atrial contraction)     PONV (postoperative nausea and vomiting)     Seasonal allergies     Sleep apnea     chele appliance used    Thyroid disease     Unspecified sleep apnea     Vaso vagal episode      Past Surgical History:   Procedure Laterality Date    ABLATION OF DYSRHYTHMIC FOCUS  03/2020    Dr Anthony Annabella  04/2018    partical resection of cecum    COLONOSCOPY  2004 ?     COLONOSCOPY  3/27/13    HP, isolated ulceration at TI with bx indicating moderate chronic inflammation    CYST REMOVAL  08/2016    R testicle    DIAPHRAGMATIC HERNIA REPAIR  05/17/2017    Dr. Justine Valadez HAND SURGERY  12/08/2020    HERNIA REPAIR Right     Inguinal    KNEE ARTHROSCOPY      NASAL SEPTUM SURGERY      OTHER SURGICAL HISTORY  05/17/2017    Lex Mueller: TIF procedure (reflux)    [place] COLONOSCOPY FLX DX W/COLLJ SPEC WHEN PFRMD N/A 8/2/2018    Dr Patterson-diverticulosis, 5 yr recall    TIPS PROCEDURE  05/19/2017    TURP      UPPER GASTROINTESTINAL ENDOSCOPY  3/27/13    GERD, biopsy pos Ashton's, neg dysplasia. biopsies neg EoE, CS    UPPER GASTROINTESTINAL ENDOSCOPY  3/2014    gastritis, esophagitis. biopsies neg for intestinal metaplasia/dysplasia    UPPER GASTROINTESTINAL ENDOSCOPY  1/18/2016    Dr Hermes Anthony, 3 yr recall         Social History:    Smoking:  No Smoking History = 0   Alcohol:complete abstinence from alcohol    Medications:   Prior to Admission medications    Medication Sig Start Date End Date Taking?  Authorizing Provider   apixaban (ELIQUIS) 5 MG TABS tablet Take 5 mg by mouth 2 times daily   Yes Historical Provider, MD   vitamin C (ASCORBIC ACID) 500 MG tablet Take 1,000 mg by mouth daily   Yes Historical Provider, MD   Multiple Vitamins-Minerals (PRESERVISION AREDS 2+MULTI VIT PO) Take 1 tablet by mouth daily    Yes Historical Provider, MD   Omega-3 Fatty Acids (FISH OIL) 1000 MG CAPS Take 1,000 mg by mouth daily as needed    Yes Historical Provider, MD   MAGNESIUM MALATE PO Take 76 mg PE by mouth daily as needed   Yes Historical Provider, MD   Calcium Carbonate-Vitamin D (CALTRATE 600+D PO) Take 1 tablet by mouth daily   Yes Historical Provider, MD   Cholecalciferol (VITAMIN D3) 34710 units CAPS Take 1 capsule by mouth once a week    Yes Historical Provider, MD   fluticasone (FLONASE) 50 MCG/ACT nasal spray 2 sprays by Nasal route daily   Yes Historical Provider, MD   sildenafil (REVATIO) 20 MG tablet Take 20 mg by mouth 3 4 tablets prn   Yes Historical Provider, MD   levothyroxine (SYNTHROID) 50 MCG tablet Take 50 mcg by mouth Daily. Yes Historical Provider, MD       Allergies:    Allergies   Allergen Reactions    Dilaudid [Fd&C Blue #1 Al Aly-Hydromorphone] Nausea Only       Review of systems:     * Constitutional: negative     * HEENT: negative     * Skin: negative     * Cardiac: negative     * Respiratory: negative     * GI: negative     * : negative     * Neuro: negative     * CNS: negative     * Extremities: negative     * Endcrine: negative     Physical Exam:   BP (!) 128/102   Pulse 102   Temp 97.5 °F (36.4 °C) (Tympanic)   Resp 16   Ht 5' 10\" (1.778 m)   Wt 180 lb (81.6 kg)   SpO2 95%   BMI 25.83 kg/m²     - General: normal development and appearance     - HEENT: normocephalic, RITA     - Skin: pink, warm, normal tone     - Neck: supple, no masses,     - Chest: no rales or wheezes, normal expansion     - Heart: RRR, no murmurs/gallops     - Abdomen: soft, nondistended, nontender, normal sounds     - Vascular: normal pulses, color, tone     - Pysch/Neuro: normal affect, mood, alert and oriented     - Musculoskeletal: right shoulder TTP and decreased ROM and strength. Impression: Right shoulder RTCR arthropathy      Surgical Plan: Right reverse TSA.       Electronically signed by Primitivo Garcia MD on 7/12/2021 at 7:13 AM

## 2021-07-12 NOTE — CONSULTS
[facility]) Cardiology   Consult Note   Sid Heck       Requesting MD:  Loretta Brink MD   Admit Status:         History obtained from:   [x] Patient  [x] Other (specify): wife at bedside     Patient:  Bailey Gaines III  [id number]     Chief Complaint: Atrial fibrillation    HPI: Mr. Jonatan Dunham is a 70 y.o. male with a history of atrial fibrillation status post ablation procedure, patient is taking Eliquis for stroke prophylaxis, history of GERD, history of cyclical apnea not on CPAP, history of stable coronary artery disease, patient is following with Dr. Carolyn Dodson from cardiology    He was scheduled for right shoulder surgery today and I was consulted because his EKG showed that he is in atrial fibrillation prior to the procedure    I met the patient and the preop area with his wife at bedside, he is a retired physician(a pediatrician)  He tells me that he is supposed to have right shoulder surgery today and he is concerned regarding his atrial fibrillation    He denies any active chest pain or shortness of breath or palpitation or syncope  History does not take any medication for A. fib except Eliquis 5 mg twice daily without any major bleeding issues    Review of Systems:  Review of Systems   Constitutional: Negative. Respiratory: Negative. Cardiovascular: Negative. Gastrointestinal: Negative. Endocrine: Negative. Genitourinary: Negative. Musculoskeletal: Positive for arthralgias (Right shoulder pain). Skin: Negative. Neurological: Negative. Hematological: Negative.         Cardiac Specific Data:  Specialty Problems        Cardiology Problems    Coronary artery disease involving native coronary artery        Vasovagal syncope              Past Medical History:  Past Medical History:   Diagnosis Date    Atrial fibrillation (HCC)     Colon polyp     GERD (gastroesophageal reflux disease)     Irregular heart beat     on occ/better now    Osteoarthritis     knee left    PAC (premature atrial contraction)     PONV (postoperative nausea and vomiting)     Seasonal allergies     Sleep apnea     chele appliance used    Thyroid disease     Unspecified sleep apnea     Vaso vagal episode         Past Surgical History:  Past Surgical History:   Procedure Laterality Date    ABLATION OF DYSRHYTHMIC FOCUS  03/2020    Dr Franci Jeffers  04/2018    partical resection of cecum    COLONOSCOPY  2004 ?     COLONOSCOPY  3/27/13    HP, isolated ulceration at TI with bx indicating moderate chronic inflammation    CYST REMOVAL  08/2016    R testicle    DIAPHRAGMATIC HERNIA REPAIR  05/17/2017    Dr. Cox Watts HAND SURGERY  12/08/2020    HERNIA REPAIR Right     Inguinal    KNEE ARTHROSCOPY      NASAL SEPTUM SURGERY      OTHER SURGICAL HISTORY  05/17/2017    Genevieve Jameson: TIF procedure (reflux)    [place] COLONOSCOPY FLX DX W/COLLJ SPEC WHEN PFRMD N/A 8/2/2018    Dr Patterson-diverticulosis, 5 yr recall    TIPS PROCEDURE  05/19/2017    TURP      UPPER GASTROINTESTINAL ENDOSCOPY  3/27/13    GERD, biopsy pos Ashton's, neg dysplasia. biopsies neg EoE, CS    UPPER GASTROINTESTINAL ENDOSCOPY  3/2014    gastritis, esophagitis.  biopsies neg for intestinal metaplasia/dysplasia    UPPER GASTROINTESTINAL ENDOSCOPY  1/18/2016    Dr Aylin Fang, 3 yr recall       Past Family History:  Family History   Problem Relation Age of Onset    Stroke Mother     Heart Failure Mother     Diabetes Mother     Atrial Fibrillation Mother     Colon Cancer Father     Colon Polyps Father     Liver Cancer Father     Colon Cancer Maternal Grandmother     Colon Polyps Maternal Grandmother     Colon Cancer Paternal Grandfather     Colon Polyps Paternal Grandfather     Other Sister         neuro disease    Esophageal Cancer Neg Hx     Liver Disease Neg Hx     Rectal Cancer Neg Hx     Stomach Cancer Neg Hx        Past Social History:  Social History     Socioeconomic History    Marital Historical Provider, MD   Omega-3 Fatty Acids (FISH OIL) 1000 MG CAPS Take 1,000 mg by mouth daily as needed    Yes Historical Provider, MD   MAGNESIUM MALATE PO Take 76 mg PE by mouth daily as needed   Yes Historical Provider, MD   Calcium Carbonate-Vitamin D (CALTRATE 600+D PO) Take 1 tablet by mouth daily   Yes Historical Provider, MD   Cholecalciferol (VITAMIN D3) 64903 units CAPS Take 1 capsule by mouth once a week    Yes Historical Provider, MD   fluticasone (FLONASE) 50 MCG/ACT nasal spray 2 sprays by Nasal route daily   Yes Historical Provider, MD   sildenafil (REVATIO) 20 MG tablet Take 20 mg by mouth 3 4 tablets prn   Yes Historical Provider, MD   levothyroxine (SYNTHROID) 50 MCG tablet Take 50 mcg by mouth Daily. Yes Historical Provider, MD       Current Meds:   ceFAZolin  2,000 mg Intravenous Once    sodium chloride flush  5-40 mL Intravenous 2 times per day    famotidine (PEPCID) injection  20 mg Intravenous Once    scopolamine  1 patch Transdermal Once    bupivacaine (PF)        metoprolol tartrate  25 mg Oral Once       Current Infused Meds:   lactated ringers      sodium chloride         Physical Exam:  Vitals:    07/12/21 0702   BP: (!) 128/102   Pulse: 102   Resp: 16   Temp: 97.5 °F (36.4 °C)   SpO2: 95%     No intake or output data in the 24 hours ending 07/12/21 0900  Estimated body mass index is 25.83 kg/m² as calculated from the following:    Height as of this encounter: 5' 10\" (1.778 m). Weight as of this encounter: 180 lb (81.6 kg). Physical Exam  Vitals reviewed. Constitutional:       Appearance: Normal appearance. HENT:      Head: Normocephalic. Mouth/Throat:      Mouth: Mucous membranes are moist.   Cardiovascular:      Rate and Rhythm: Normal rate. Rhythm irregular. Pulses: Normal pulses. Heart sounds: Normal heart sounds. No murmur heard. Pulmonary:      Effort: Pulmonary effort is normal.      Breath sounds: Normal breath sounds.    Abdominal: General: Bowel sounds are normal.      Palpations: Abdomen is soft. Tenderness: There is no abdominal tenderness. Musculoskeletal:         General: Normal range of motion. Right lower leg: No edema. Left lower leg: No edema. Skin:     General: Skin is warm. Neurological:      General: No focal deficit present. Mental Status: He is alert and oriented to person, place, and time. Psychiatric:         Mood and Affect: Mood normal.         Behavior: Behavior normal.         Labs:  No results for input(s): WBC, HGB, PLT in the last 72 hours. No results for input(s): NA, K, CL, CO2, BUN, CREATININE, LABGLOM, MG, CALCIUM, PHOS in the last 72 hours. CK, CKMB, Troponin: No results for input(s): CKTOTAL, CKMB, TROPONINI in the last 72 hours. Last 3 BNP:  No results for input(s): PROBNP in the last 72 hours. IMAGING:    EKG - Afib rate 93    ECHO  TTE procedure:ECHO NO CONTRAST WITH DOP/COLR. Study Location: Echo Lab  Technical Quality: Adequate visualization     Patient Status: Outpatient     Indications:Atrial fibrillation.      Conclusions      Signature      ----------------------------------------------------------------   Electronically signed by Linda Colindres MD(Interpreting   physician) on 12/13/2019 03:29 PM   ----------------------------------------------------------------      Findings      Mitral Valve   Structurally normal.   Normal mitral valve leaflet mobility. Trace mitral regurgitation. Aortic Valve   Aortic valve appears to be tricuspid. Structurally normal aortic valve. Trace aortic regurgitation. Tricuspid Valve   Tricuspid valve is structurally normal.   Mild tricuspid regurgitation. Pulmonic Valve   The pulmonic valve was not well visualized. Left Atrium   Mildly dilated left atrium. Left Ventricle   Normal left ventricular size with preserved LV function and an estimated   ejection fraction of approximately 55-60%.    Abnormal rhythm precludes assessment of diastolic function. No evidence of left ventricular mass or thrombus noted. Right Atrium   Normal right atrial dimension with no evidence of thrombus or mass noted. Right Ventricle   Normal right ventricular size with preserved RV function. Right ventricular systolic pressure is noted at 26 mmHg. Pericardial Effusion   No evidence of significant pericardial effusion is noted. Pleural Effusion   No evidence of pleural effusion. Miscellaneous   Aortic root is within normal limits. M-Mode Measurements (cm)      LVIDd: 4.09 cm                       LVIDs: 2.8 cm   IVSd: 0.93 cm   LVPWd: 0.84 cm                       AO Root Dimension: 2.2 cm   % Ejection Fraction: 55 %            LA: 3.5 cm                                        LVOT: 2.1 cm     Doppler Measurements:      AV Peak Gradient: 2.64 mmHg        MV Peak E-Wave: 78.6 cm/s      TR Velocity:176 cm/s               MV Peak Gradient: 2.47 mmHg   TR Gradient:12.39 mmHg   Estimated RAP:5 mmHg   RVSP:26 mmHg  ----------------------------    Previous Cath     Procedure Type      Diagnostic procedure:DCA, Coronary Angiogram, Left Heart Cath, Left   Ventriculogram, Left Ventricular Pressure Measurement      Conclusions      Nonobstructive CAD with minimal disease. Normal LV ejection fraction. Recommendations      Medical management.       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 Felipe Watson, he has history of A. fib recurrence after the procedure but he has minimal symptoms, history of coronary artery disease with minimal atherosclerosis on heart catheterization on January 2020, he takes Eliquis 5 mg twice daily for stroke prevention but no other medications at this time    He is here today in the preoperative area for right shoulder surgery, anesthesiologist Dr. Carmen Garcias consulted me to check on him regarding his atrial fibrillation, his EKG today showed A. fib with heart rate of 92 beats per minutes, he is not symptomatic with any chest pain or shortness of breath at this point    Patient was reassured, his blood pressure is little bit elevated we will start him on beta-blocker with metoprolol 25 mg twice daily first dose now  Patient might need IV metoprolol or even cardioversion if he developed A. fib with RVR during surgery while under anesthesia, this was discussed with him and his wife and anesthesiology team    We are all in agreement with the plan    Thank you for the consultation, will follow up with you after the surgery      Thank you for the consult, we appreciate the opportunity to provide care to your patients. Feel free to contact me if I can be of any further assistance.       Electronically signed by Lovely Carreon MD on 7/12/2021 at 9:00 AM    Lovely Carreon MD, [facility]  Interventional Cardiologist, Endovascular Specialist   Medical Director, Structural Heart Program   [facility]

## 2021-08-05 ENCOUNTER — HOSPITAL ENCOUNTER (OUTPATIENT)
Dept: NON INVASIVE DIAGNOSTICS | Age: 71
Discharge: HOME | End: 2021-08-05
Payer: MEDICARE

## 2021-08-05 DIAGNOSIS — R07.9 CHEST PAIN, UNSPECIFIED TYPE: ICD-10-CM

## 2021-08-05 LAB
LV EF: 70 %
LVEF MODALITY: NORMAL

## 2021-08-05 PROCEDURE — 93306 TTE W/DOPPLER COMPLETE: CPT

## 2021-08-24 ENCOUNTER — OFFICE VISIT (OUTPATIENT)
Dept: CARDIOLOGY CLINIC | Age: 71
End: 2021-08-24
Payer: MEDICARE

## 2021-08-24 VITALS
SYSTOLIC BLOOD PRESSURE: 110 MMHG | HEIGHT: 70 IN | DIASTOLIC BLOOD PRESSURE: 70 MMHG | WEIGHT: 180 LBS | HEART RATE: 96 BPM | BODY MASS INDEX: 25.77 KG/M2

## 2021-08-24 DIAGNOSIS — I48.0 PAROXYSMAL ATRIAL FIBRILLATION (HCC): Primary | ICD-10-CM

## 2021-08-24 PROCEDURE — 93000 ELECTROCARDIOGRAM COMPLETE: CPT | Performed by: INTERNAL MEDICINE

## 2021-08-24 PROCEDURE — 4040F PNEUMOC VAC/ADMIN/RCVD: CPT | Performed by: INTERNAL MEDICINE

## 2021-08-24 PROCEDURE — 1123F ACP DISCUSS/DSCN MKR DOCD: CPT | Performed by: INTERNAL MEDICINE

## 2021-08-24 PROCEDURE — 3017F COLORECTAL CA SCREEN DOC REV: CPT | Performed by: INTERNAL MEDICINE

## 2021-08-24 PROCEDURE — 99214 OFFICE O/P EST MOD 30 MIN: CPT | Performed by: INTERNAL MEDICINE

## 2021-08-24 PROCEDURE — 1036F TOBACCO NON-USER: CPT | Performed by: INTERNAL MEDICINE

## 2021-08-24 PROCEDURE — G8417 CALC BMI ABV UP PARAM F/U: HCPCS | Performed by: INTERNAL MEDICINE

## 2021-08-24 PROCEDURE — G8427 DOCREV CUR MEDS BY ELIG CLIN: HCPCS | Performed by: INTERNAL MEDICINE

## 2021-08-24 RX ORDER — AZELASTINE 1 MG/ML
SPRAY, METERED NASAL
COMMUNITY
Start: 2021-08-06

## 2021-08-24 NOTE — PROGRESS NOTES
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 Previous angiographic assessment revealed only luminal irregularity with some mild ectasia of his proximal LAD. In March 2020 he underwent an ablation. Subsequent follow-up has demonstrated some recurrent episodes of atrial fibrillation which are asymptomatic. Followed by the EP service, decision has been made to merely continue Eliquis therapy, holding the possibility of repeat ablation in reserve. On return today he relates no symptoms of dysrhythmia, LV dysfunction, or coronary ischemia. He has been very active with the only real significant recent medical issue being trauma to his right shoulder requiring a replacement. He was found to be in atrial fibrillation at the time of his orthopedic procedure. He has been vaccinated for COVID-19. PHYSICAL EXAM  On exam he carries 180 pounds in a 5 foot 10 inch frame. Pressure is 110/70 with pulse of 96. Normal male balding pattern. EOMs full, sclerae and conjunctiva normal. PERRLA. Mask in place. Trachea midline with no neck masses. Assessment of internal jugular veins reveals no elevation of central venous pressure at 45 degrees. Carotid pulses normal without delay or bruit. Thyroid normal to palpation. Chest exam reveals normal respiratory effort, no abnormal breath sounds and normal expiratory phase. No skin lesions seen. PMI normal. S1, S2 normal without murmur or ray or click. Normal bowel sounds without palpable mass or bruit. No clubbing or acrocyanosis. No significant lower extremity edema or signs of venous insufficiency. General motor strength appears to be within normal limits. Normal range of motion with normal gait. Alert, oriented x 3, memory and cognition normal as reflected by history and conversation.   EKG reveals sinus rhythm with poor R wave progression and some nonspecific ST-T wave changes along with borderline prolonged QT interval.    ASSESSMENT/PLAN:   1. Paroxysmal atrial fibrillation -at this point the conscious decision has been made to merely continue Eliquis therapy. Continue Eliquis  2. Obstructive sleep apnea -at present is proceeding through a sequence of a new mask and drug therapy in hopes of avoiding the need for CPAP therapy. Apparently saturations have remained in excess of 90%. 3.  Lipid status -no values in our records. Monitored and managed by primary care  4.   Pandemic response -appropriate/vaccinated

## 2021-09-15 RX ORDER — APIXABAN 5 MG/1
TABLET, FILM COATED ORAL
Qty: 60 TABLET | Refills: 5 | Status: SHIPPED | OUTPATIENT
Start: 2021-09-15 | End: 2021-11-11 | Stop reason: SDUPTHER

## 2022-02-24 ENCOUNTER — TELEMEDICINE (OUTPATIENT)
Dept: CARDIOLOGY CLINIC | Age: 72
End: 2022-02-24
Payer: MEDICARE

## 2022-02-24 DIAGNOSIS — I48.0 PAROXYSMAL ATRIAL FIBRILLATION (HCC): Primary | ICD-10-CM

## 2022-02-24 PROCEDURE — G8427 DOCREV CUR MEDS BY ELIG CLIN: HCPCS | Performed by: CLINICAL NURSE SPECIALIST

## 2022-02-24 PROCEDURE — 4040F PNEUMOC VAC/ADMIN/RCVD: CPT | Performed by: CLINICAL NURSE SPECIALIST

## 2022-02-24 PROCEDURE — 3017F COLORECTAL CA SCREEN DOC REV: CPT | Performed by: CLINICAL NURSE SPECIALIST

## 2022-02-24 PROCEDURE — 1123F ACP DISCUSS/DSCN MKR DOCD: CPT | Performed by: CLINICAL NURSE SPECIALIST

## 2022-02-24 PROCEDURE — 99213 OFFICE O/P EST LOW 20 MIN: CPT | Performed by: CLINICAL NURSE SPECIALIST

## 2022-02-24 ASSESSMENT — ENCOUNTER SYMPTOMS
EYE REDNESS: 0
NAUSEA: 0
SHORTNESS OF BREATH: 0
VOMITING: 0
COUGH: 0
WHEEZING: 0
CHEST TIGHTNESS: 0
ABDOMINAL PAIN: 0
FACIAL SWELLING: 0

## 2022-02-24 NOTE — PROGRESS NOTES
En Hartman is a 67 y.o. male evaluated via telephone on 2022. Consent:  He and/or health care decision maker is aware that that he may receive a bill for this telephone service, depending on his insurance coverage, and has provided verbal consent to proceed: Yes      Documentation:  I communicated with the patient and/or health care decision maker about PAF. Details of this discussion including any medical advice provided      I affirm this is a Patient Initiated Episode with an Established Patient who has not had a related appointment within my department in the past 7 days or scheduled within the next 24 hours. Total Time: minutes: 11-20 minutes    Note: not billable if this call serves to triage the patient into an appointment for the relevant concern      HARLEY Caraballo      2022    TELEHEALTH EVALUATION -- Audio/Telephone (During [id number]-[id number] public health emergency)  This service was provided through telehealth, by HARLEY Dunne at [facility] in [address] and the patient in his/her home via telephone call. Medical Assistant reviewed current medications, pertinent history, and reason for visit. Diagnosis Orders   1. 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 Previous angiographic assessment revealed only luminal irregularity with some mild ectasia of his proximal LAD. In 2020, he underwent an ablation. Subsequent follow-up has demonstrated some recurrent episodes of atrial fibrillation which are asymptomatic. Followed by the EP service, decision has been made to merely continue Eliquis therapy, holding the possibility of repeat ablation in reserve.     He is doing well overall and reports he recently had knee surgery about a month ago. He denies any cardiac symptoms such as palpitations or fast heart rate. He has been asymptomatic with his A. fib in the past.  No complaints of chest pain or unusual dyspnea. Energy level remains good. Review of Systems   Constitutional: Negative for activity change, diaphoresis, fatigue, fever and unexpected weight change. HENT: Negative for facial swelling and nosebleeds. Eyes: Negative for redness and visual disturbance. Respiratory: Negative for cough, chest tightness, shortness of breath and wheezing. Cardiovascular: Negative for chest pain, palpitations and leg swelling. Gastrointestinal: Negative for abdominal pain, nausea and vomiting. Endocrine: Negative for cold intolerance and heat intolerance. Genitourinary: Negative for dysuria and hematuria. Musculoskeletal: Negative for arthralgias and myalgias. C/o knee pain   Skin: Negative for pallor and rash. Neurological: Negative for dizziness, seizures, syncope, weakness and light-headedness. Hematological: Does not bruise/bleed easily. Psychiatric/Behavioral: Negative for agitation. The patient is not nervous/anxious. Prior to Visit Medications    Medication Sig Taking?  Authorizing Provider   apixaban (ELIQUIS) 5 MG TABS tablet TAKE 1 TABLET BY MOUTH TWICE A DAY Yes HARLEY Dial   azelastine (ASTELIN) 0.1 % nasal spray SPRAY 1 2 SPRAYS INTO EACH NOSTRIL TWICE A DAY ROUTINELY OR AS NEEDED Yes Historical Provider, MD   ondansetron (ZOFRAN) 4 MG tablet Take 1 tablet by mouth daily as needed for Nausea or Vomiting Yes Jerrell Sun MD   MAGNESIUM MALATE PO Take 76 mg PE by mouth daily as needed Yes Historical Provider, MD   Calcium Carbonate-Vitamin D (CALTRATE 600+D PO) Take 1 tablet by mouth daily Yes Historical Provider, MD   Cholecalciferol (VITAMIN D3) 03636 units CAPS Take 1 capsule by mouth once a week  Yes Historical Provider, MD   fluticasone (FLONASE) 50 MCG/ACT nasal spray 2 sprays by Nasal route daily  Yes Historical Provider, MD   sildenafil (REVATIO) 20 MG tablet Take 20 mg by mouth 3 4 tablets prn Yes Historical Provider, MD   levothyroxine (SYNTHROID) 50 MCG tablet Take 50 mcg by mouth Daily. Yes Historical Provider, MD       Social History     Tobacco Use    Smoking status: Never Smoker    Smokeless tobacco: Never Used   Vaping Use    Vaping Use: Never used   Substance Use Topics    Alcohol use: Yes     Comment: social    Drug use: No        Allergies   Allergen Reactions    Dilaudid [Fd&C Blue #1 Al Aly-Hydromorphone] Nausea Only       Past Medical History:   Diagnosis Date    Atrial fibrillation (HCC)     Colon polyp     GERD (gastroesophageal reflux disease)     Irregular heart beat     on occ/better now    Osteoarthritis     knee left    PAC (premature atrial contraction)     PONV (postoperative nausea and vomiting)     Seasonal allergies     Sleep apnea     chele appliance used    Thyroid disease     Unspecified sleep apnea     Vaso vagal episode        Past Surgical History:   Procedure Laterality Date    ABLATION OF DYSRHYTHMIC FOCUS  03/2020    Dr Jeff Carrion    APPENDECTOMY  04/2018    partical resection of cecum    COLONOSCOPY  2004 ?         COLONOSCOPY  3/27/13    HP, isolated ulceration at TI with bx indicating moderate chronic inflammation    CYST REMOVAL  08/2016    R testicle    DIAPHRAGMATIC HERNIA REPAIR  05/17/2017    Dr. Connor Prim HAND SURGERY  12/08/2020    HERNIA REPAIR Right     Inguinal    KNEE ARTHROSCOPY      NASAL SEPTUM SURGERY      OTHER SURGICAL HISTORY  05/17/2017    Salud Franks: TIF procedure (reflux)    [place] COLONOSCOPY FLX DX W/COLLJ SPEC WHEN PFRMD N/A 8/2/2018    Dr Patterson-diverticulosis, 5 yr recall    SHOULDER ARTHROPLASTY Right 7/12/2021    RIGHT REVERSE SHOULDER TOTAL ARTHROPLASTY performed by Shana Mixon MD at [place] [address]  05/19/2017    [facility]      UPPER GASTROINTESTINAL ENDOSCOPY 3/27/13    GERD, biopsy pos Ashton's, neg dysplasia. biopsies neg EoE, CS    UPPER GASTROINTESTINAL ENDOSCOPY  3/2014    gastritis, esophagitis. biopsies neg for intestinal metaplasia/dysplasia    UPPER GASTROINTESTINAL ENDOSCOPY  1/18/2016    Dr Rachel Michael, 3 yr recall       Family History   Problem Relation Age of Onset    Stroke Mother     Heart Failure Mother     Diabetes Mother     Atrial Fibrillation Mother     Colon Cancer Father     Colon Polyps Father     Liver Cancer Father     Colon Cancer Maternal Grandmother     Colon Polyps Maternal Grandmother     Colon Cancer Paternal Grandfather     Colon Polyps Paternal Grandfather     Other Sister         neuro disease    Esophageal Cancer Neg Hx     Liver Disease Neg Hx     Rectal Cancer Neg Hx     Stomach Cancer Neg Hx        PHYSICAL EXAMINATION:    Patient-Reported Vitals 2/24/2022   Patient-Reported Weight 175lb   Patient-Reported Height 5 10   Patient-Reported Systolic 96   Patient-Reported Diastolic 54         DATA:  Echo 8/5/21  Conclusions      Summary   Normal left ventricular size with hyperdynamic LV function and an   estimated ejection fraction of approximately > 70%. No gross regional wall   motion abnormalities. Normal LV wall thickness. Normal diastolic function. Normal size left atrium. No evidence for PFO/ASD by color flow Doppler only. Normal right ventricular size with grossly preserved RV function. Normal right atrial dimension. No clinically significant valve regurgitation or stenosis. Aortic root is within normal limits. No evidence of significant pericardial effusion is noted. When compared to study report dated 12/13/2019, the findings are largely   stable.       Signature      ----------------------------------------------------------------   Electronically signed by Gamal Crouch(Interpreting physician)   on 08/05/2021 12:00 PM    Heart Cath 1/2/20  Conclusions      Nonobstructive CAD with minimal disease. Normal LV ejection fraction. Recommendations      Medical management. Signatures      ----------------------------------------------------------------   Electronically signed by Mary Nielsen MD(Performing Physician) on   01/02/2020 22:21      ASSESSMENT/PLAN:  1. Paroxysmal atrial fibrillation (HCC)  Stable per patient report without recurrence. He remains anticoagulated with Eliquis. He states he is watching his heart rate closely on his apple watch. He continues to follow with Dr. Doris Carey, electrophysiologist.  Issues with hypotension and therefore no beta-blocker has been prescribed and antiarrhythmics have been intolerable in the past.  Continue to observe. Call for palpitations or fast heart rates      Return in about 6 months (around 8/24/2022) for Dr. Noam Hernandez, as scheduled. An  electronic signature was used to authenticate this note. --HARLEY Caraballo on 2/24/2022 at 10:16 AM        Pursuant to the emergency declaration under the [facility] waiver authority and the [employer] Act, this telephone Visit was conducted, with patient's consent, to reduce the patient's risk of exposure to COVID-19 and provide continuity of care for an established patient. Services were provided through a telephone discussion virtually to substitute for in-person clinic visit.

## 2022-08-22 ENCOUNTER — TELEPHONE (OUTPATIENT)
Dept: CARDIOLOGY CLINIC | Age: 72
End: 2022-08-22

## 2022-08-23 ENCOUNTER — OFFICE VISIT (OUTPATIENT)
Dept: CARDIOLOGY CLINIC | Age: 72
End: 2022-08-23
Payer: MEDICARE

## 2022-08-23 VITALS
HEIGHT: 70 IN | WEIGHT: 182 LBS | BODY MASS INDEX: 26.05 KG/M2 | SYSTOLIC BLOOD PRESSURE: 110 MMHG | HEART RATE: 81 BPM | DIASTOLIC BLOOD PRESSURE: 64 MMHG

## 2022-08-23 DIAGNOSIS — I48.0 PAROXYSMAL ATRIAL FIBRILLATION (HCC): Primary | ICD-10-CM

## 2022-08-23 PROCEDURE — 93000 ELECTROCARDIOGRAM COMPLETE: CPT | Performed by: INTERNAL MEDICINE

## 2022-08-23 PROCEDURE — 99214 OFFICE O/P EST MOD 30 MIN: CPT | Performed by: INTERNAL MEDICINE

## 2022-08-23 PROCEDURE — 3017F COLORECTAL CA SCREEN DOC REV: CPT | Performed by: INTERNAL MEDICINE

## 2022-08-23 PROCEDURE — G8427 DOCREV CUR MEDS BY ELIG CLIN: HCPCS | Performed by: INTERNAL MEDICINE

## 2022-08-23 PROCEDURE — G8417 CALC BMI ABV UP PARAM F/U: HCPCS | Performed by: INTERNAL MEDICINE

## 2022-08-23 PROCEDURE — 1123F ACP DISCUSS/DSCN MKR DOCD: CPT | Performed by: INTERNAL MEDICINE

## 2022-08-23 PROCEDURE — 1036F TOBACCO NON-USER: CPT | Performed by: INTERNAL MEDICINE

## 2022-08-23 NOTE — PROGRESS NOTES
HISTORY  49-year-old lady with a history of sleep apnea and atrial fibrillation returns for routine follow-up visit. Prior evaluation includes an angiographic assessment which demonstrated only luminal irregularities with some mild ectasia of the proximal LAD. An ablation was performed in March 2020 but there had been recurrent episodes of atrial fibrillation which have been asymptomatic. He has been followed by the [facility] stroke physiology service with the decision made to merely continue Eliquis coverage though the possibility of repeat ablation was held in reserve. On return today he relates several recent episodes of atrial fibrillation, the longest lasting little more than an hour. There are some suspicion that this was triggered by dehydration and resolved spontaneously after increased salt intake [pickle juice]. Additionally there has been some difficulty with his sleep apnea management and there is a repeat sleep study scheduled for next week with his suspicion that he might convert to BiPAP. He has had no chest discomfort or change in exercise tolerance. There is understandably considerable concern about Eliquis therapy given their active lifestyle including mountain biking. He has been vaccinated and boosted for COVID-19. PHYSICAL EXAM  On exam he carries 182 pounds in a 5 to 10 inch frame. Pressure is 110/64 with a regular pulse of 81. EOMs full, sclerae and conjunctiva normal. PERRLA. Mask in place. Trachea midline with no neck masses. Assessment of internal jugular veins reveals no elevation of central venous pressure at 45 degrees. Carotid pulses normal without delay or bruit. Thyroid normal to palpation. Chest exam reveals normal respiratory effort, no abnormal breath sounds and normal expiratory phase. Multiple nevi. PMI normal. S1, S2 normal without murmur or ray or click. Normal bowel sounds without palpable mass or bruit. No clubbing or acrocyanosis.  No significant lower extremity edema or signs of venous insufficiency. General motor strength appears to be within normal limits. Normal range of motion with normal gait. Alert, oriented x 3, memory and cognition normal as reflected by history and conversation. EKG reveals normal sinus rhythm with frequent PACs, low voltage in limb leads, and inferior Q waves precluding exclusion of a previous inferior infarct [chronic]. ASSESSMENT/PLAN:   Paroxysmal atrial fibrillation -recurrent episodes which perhaps occurred in part by an adequate sleep apnea coverage. The plan is for the repeat sleep study with address at that problem before any more steps taken. Continue Eliquis. [He has been intolerant of multiple antiarrhythmics].   Sleep apnea -repeat sleep study scheduled  Pandemic response -appropriate/vaccinated/boosted

## 2023-02-05 ENCOUNTER — HOSPITAL ENCOUNTER (EMERGENCY)
Age: 73
Discharge: HOME | End: 2023-02-05
Attending: EMERGENCY MEDICINE
Payer: MEDICARE

## 2023-02-05 ENCOUNTER — APPOINTMENT (OUTPATIENT)
Dept: CT IMAGING | Age: 73
End: 2023-02-05
Payer: MEDICARE

## 2023-02-05 VITALS
HEIGHT: 70 IN | RESPIRATION RATE: 25 BRPM | BODY MASS INDEX: 26.48 KG/M2 | TEMPERATURE: 98.1 F | WEIGHT: 185 LBS | DIASTOLIC BLOOD PRESSURE: 88 MMHG | OXYGEN SATURATION: 94 % | HEART RATE: 77 BPM | SYSTOLIC BLOOD PRESSURE: 118 MMHG

## 2023-02-05 DIAGNOSIS — H57.04 EPISODIC MYDRIASIS OF RIGHT EYE: Primary | ICD-10-CM

## 2023-02-05 PROCEDURE — 70450 CT HEAD/BRAIN W/O DYE: CPT

## 2023-02-05 PROCEDURE — 70450 CT HEAD/BRAIN W/O DYE: CPT | Performed by: RADIOLOGY

## 2023-02-05 PROCEDURE — 99284 EMERGENCY DEPT VISIT MOD MDM: CPT

## 2023-02-05 ASSESSMENT — ENCOUNTER SYMPTOMS
RHINORRHEA: 0
DIARRHEA: 0
SORE THROAT: 0
ABDOMINAL PAIN: 0
NAUSEA: 0
SINUS PRESSURE: 0
VOMITING: 0
SHORTNESS OF BREATH: 0

## 2023-02-05 NOTE — ED PROVIDER NOTES
[facility] EMERGENCY DEPT  eMERGENCY dEPARTMENT eNCOUnter      Pt Name: Сергей Bacon  MRN: [id number]  [place] 1950  Date of evaluation: 2/5/2023  Provider: Rachelle Fatima MD    [address] [place] [address]       Chief Complaint   Patient presents with    Headache     X2-3 days    Eye Problem     Reports blurry vision around 0900 and has progressively worsened    Other     Unequal pupils          HISTORY OF PRESENT ILLNESS   (Location/Symptom, Timing/Onset,Context/Setting, Quality, Duration, Modifying Factors, Severity)  Note limiting factors. Сергей Bacon is a 68 y.o. male who presents to the emergency department left pupil dilation and mild headache     HPI patient is a 69-year-old white male; with history of atrial fibrillation on Eliquis; reflux disease; osteoarthritis; obstructive sleep apnea; hypothyroidism; who reports a mild headache to the posterior aspect of his head which he initially regarded as related to wearing his sleep apnea machine also noted some blurred vision this morning and that his left pupil was dilated mildly compared to his right. He has no visual loss. He reports some lack of clarity or blurred vision upon distance vision. This is in the setting of using ipratropium nasal spray drops for nasal congestion. He rates his headache as 1 or 2 out of 10 in the back of his head and that actually began prior to this episode of the dilated pupil. It has not been severe. There is no vomiting. There is no focal deficit. NursingNotes were reviewed. REVIEW OF SYSTEMS    (2-9 systems for level 4, 10 or more for level 5)     Review of Systems   Constitutional:  Negative for chills, diaphoresis, fatigue and fever. HENT:  Negative for rhinorrhea, sinus pressure and sore throat. Eyes:  Positive for visual disturbance. Respiratory:  Negative for shortness of breath. Cardiovascular:  Negative for chest pain.    Gastrointestinal:  Negative for abdominal pain, diarrhea, nausea and vomiting. Genitourinary:  Negative for difficulty urinating and dysuria. Musculoskeletal:  Negative for arthralgias and myalgias. Skin:  Negative for rash. Neurological:  Negative for weakness. Psychiatric/Behavioral:  Negative for confusion. All other systems reviewed and are negative. PAST MEDICALHISTORY     Past Medical History:   Diagnosis Date    Atrial fibrillation ([address]     Colon polyp     GERD (gastroesophageal reflux disease)     Irregular heart beat     on occ/better now    Osteoarthritis     knee left    PAC (premature atrial contraction)     PONV (postoperative nausea and vomiting)     Seasonal allergies     Sleep apnea     chele appliance used    Thyroid disease     Unspecified sleep apnea     Vaso vagal episode          SURGICAL HISTORY       Past Surgical History:   Procedure Laterality Date    ABLATION OF DYSRHYTHMIC FOCUS  03/2020    Dr Monson Parents  04/2018    partical resection of cecum    COLONOSCOPY  2004 ?     COLONOSCOPY  3/27/13    HP, isolated ulceration at TI with bx indicating moderate chronic inflammation    CYST REMOVAL  08/2016    R testicle    DIAPHRAGMATIC HERNIA REPAIR  05/17/2017    Dr. Reynoso Misusan      HAND SURGERY  12/08/2020    HERNIA REPAIR Right     Inguinal    KNEE ARTHROSCOPY      NASAL SEPTUM SURGERY      OTHER SURGICAL HISTORY  05/17/2017    Rishi Johansen: TIF procedure (reflux)    ID COLONOSCOPY FLX DX W/COLLJ SPEC WHEN PFRMD N/A 8/2/2018    Dr Patterson-diverticulosis, 5 yr recall    SHOULDER ARTHROPLASTY Right 7/12/2021    RIGHT REVERSE SHOULDER TOTAL ARTHROPLASTY performed by Corwin Wade MD at [address]  05/19/2017    TURP      UPPER GASTROINTESTINAL ENDOSCOPY  3/27/13    GERD, biopsy pos Ashton's, neg dysplasia. biopsies neg EoE, CS    UPPER GASTROINTESTINAL ENDOSCOPY  3/2014    gastritis, esophagitis.  biopsies neg for intestinal metaplasia/dysplasia    UPPER GASTROINTESTINAL ENDOSCOPY  1/18/2016     Mimi Orantes, 3 yr recall         CURRENT MEDICATIONS     Discharge Medication List as of 2/5/2023  3:09 PM        CONTINUE these medications which have NOT CHANGED    Details   apixaban (ELIQUIS) 5 MG TABS tablet TAKE 1 TABLET BY MOUTH TWICE A DAY, Disp-180 tablet, R-3Normal      azelastine (ASTELIN) 0.1 % nasal spray SPRAY 1 2 SPRAYS INTO EACH NOSTRIL TWICE A DAY ROUTINELY OR AS NEEDEDHistorical Med      MAGNESIUM MALATE PO Take 76 mg PE by mouth daily as neededHistorical Med      Calcium Carbonate-Vitamin D (CALTRATE 600+D PO) Take 1 tablet by mouth dailyHistorical Med      Cholecalciferol (VITAMIN D3) 44808 units CAPS Take 1 capsule by mouth once a week Historical Med      fluticasone (FLONASE) 50 MCG/ACT nasal spray 2 sprays by Nasal route daily Historical Med      sildenafil (REVATIO) 20 MG tablet Take 20 mg by mouth 3 4 tablets prnHistorical Med      levothyroxine (SYNTHROID) 50 MCG tablet Take 50 mcg by mouth Daily.              ALLERGIES     Dilaudid [fd&c blue #1 [place]-hydromorphone]    FAMILY HISTORY       Family History   Problem Relation Age of Onset    Stroke Mother     Heart Failure Mother     Diabetes Mother     Atrial Fibrillation Mother     Colon Cancer Father     Colon Polyps Father     Liver Cancer Father     Colon Cancer Maternal Grandmother     Colon Polyps Maternal Grandmother     Colon Cancer Paternal Grandfather     Colon Polyps Paternal Grandfather     Other Sister         neuro disease    Esophageal Cancer Neg Hx     Liver Disease Neg Hx     Rectal Cancer Neg Hx     Stomach Cancer Neg Hx           SOCIAL HISTORY       Social History     Socioeconomic History    Marital status:    Tobacco Use    Smoking status: Never    Smokeless tobacco: Never   Vaping Use    Vaping Use: Never used   Substance and Sexual Activity    Alcohol use: Yes     Comment: social    Drug use: No    Sexual activity: Yes     Partners: Female       SCREENINGS    Sohan Coma Scale  Eye Opening: Spontaneous  Best Verbal Response: Oriented  Best Motor Response: Obeys commands  Sohan Coma Scale Score: 15        PHYSICAL EXAM    (up to 7 for level 4, 8 or more for level 5)     ED Triage Vitals [02/05/23 1210]   BP Temp Temp src Heart Rate Resp SpO2 Height Weight   (!) 126/90 97.4 °F (36.3 °C) -- 93 18 95 % 5' 10\" (1.778 m) 185 lb (83.9 kg)       Physical Exam  Vitals and nursing note reviewed. Constitutional:       Appearance: He is well-developed. HENT:      Head: Normocephalic and atraumatic. Nose: Nose normal.      Mouth/Throat:      Mouth: Mucous membranes are moist.   Eyes:      General:         Right eye: No discharge. Left eye: No discharge. Extraocular Movements: Extraocular movements intact. Conjunctiva/sclera: Conjunctivae normal.      Pupils: Pupils are equal, round, and reactive to light. Comments: Right is 2 mm and reactive; left eye is 4 mm and reactive there is no consensual pain or photophobia; conjunctiva is clear   Cardiovascular:      Rate and Rhythm: Normal rate and regular rhythm. Heart sounds: Normal heart sounds. Pulmonary:      Effort: Pulmonary effort is normal. No respiratory distress. Breath sounds: Normal breath sounds. No wheezing or rales. Abdominal:      General: Bowel sounds are normal.      Palpations: Abdomen is soft. There is no mass. Tenderness: There is no abdominal tenderness. There is no guarding or rebound. Musculoskeletal:         General: No tenderness. Normal range of motion. Cervical back: Normal range of motion and neck supple. Skin:     General: Skin is warm and dry. Capillary Refill: Capillary refill takes less than 2 seconds. Neurological:      Mental Status: He is alert and oriented to person, place, and time. Psychiatric:         Behavior: Behavior normal.         Thought Content:  Thought content normal.         Judgment: Judgment normal.     DIAGNOSTIC RESULTS     EKG: All EKG's areinterpreted by the Emergency Department Physician who either signs or Co-signs this chart in the absence of a cardiologist.    EKG shows sinus rhythm with a rate of 82 multiple PE VCs; borderline T wave abnormality    RADIOLOGY:  Non-plain film images such as CT, Ultrasound and MRI are read by the radiologist. Plain radiographic images are visualized and preliminarily interpreted bythe emergency physician with the below findings:          CT Head W/O Contrast   Final Result       1. No acute intracranial abnormality. 2. Mild chronic changes the brain parenchyma from small vessel ischemic. 3. Age-appropriate atrophic changes. Recommendation: Follow up as clinically indicated. All CT scans at this facility utilize dose modulation, iterative reconstruction, and/or weight based dosing when appropriate to reduce radiation dose to as low as reasonably achievable. Dictated and Electronically Signed by Valerie Shaw MD, [facility] CERTIFIED at [phone] 02:47:40 PM EST                       LABS:  Labs Reviewed - No data to display    All other labs were within normal range or not returned as of this dictation. EMERGENCY DEPARTMENT COURSE and DIFFERENTIAL DIAGNOSIS/MDM:   Vitals:    Vitals:    02/05/23 1345 02/05/23 1400 02/05/23 1430 02/05/23 1500   BP: 99/68 106/72 (!) 120/90 118/88   Pulse: 82 78 82 77   Resp: 19 17 21 25   Temp:    98.1 °F (36.7 °C)   SpO2: 91% 93% 94% 94%   Weight:       Height:           MDM    Patient observed and reexamined multiple times in the emergency room. Suspicion for unilateral mydriasis with other mild symptoms suggest that he got some of the ipratropium or the astelin in his eye both which have anticholinergic effects which can cause dilatation of the eye. He has no history of glaucoma and has no eye pain the cornea appears clear; he has vision but it is altered somewhat in terms of distant refraction.   The patient believes he may have gotten some of the nasal spray in his eye although he is not sure. There is no vomiting. There is no injection of the conjunctiva or headache that is significant as per patient. CT of the head was negative for any mass-effect or bleeding. I spoke with Dr. Jaylan Smith who did not think this presentation warranted further evaluation at this time other than careful watchful waiting. He did not think an MRI was necessary. Patient and family are sophisticated they will continue to observe for improvement; stop the nasal sprays; return immediately for any new or worsening  symptoms;  he does seem to be mildly improved without any intervention in the ED. Will follow up with ophthalmology for further evaluation as needed and return for any new or worsening symptoms.     Reassessment      CONSULTS:  None    PROCEDURES:  Unless otherwise noted below, none     Procedures    FINAL IMPRESSION      1. Episodic mydriasis of right eye          DISPOSITION/PLAN   DISPOSITION Decision To Discharge 02/05/2023 03:04:37 PM      PATIENT REFERRED TO:  William Arciniega MD  [address] [facility] [phone]    In 1 day      DISCHARGE MEDICATIONS:  Discharge Medication List as of 2/5/2023  3:09 PM             (Please note that portions of this note were completed with a voice recognition program.  Efforts were made to edit thedictations but occasionally words are mis-transcribed.)    Lorenzo Stephen MD (electronically signed)  Attending Emergency Physician         Lorenzo Stephen MD  02/06/23 [phone] Patricia Onofre MD  02/06/23 9978

## 2023-02-05 NOTE — DISCHARGE INSTRUCTIONS
The nature of your pupil dilation is unclear but may be related to getting the nasal spray which can be responsible for your left eye pupil dilatation. Symptoms should gradually improve if that was the case over the next 24 hours. If you develop any further symptoms such as eye pain; redness; nausea or vomiting; severe headache or your pupil becomes unreactive seek care immediately. Seek care immediately if there is any new or worsening symptoms as we discussed. Follow-up with your ophthalmologist for further evaluation as needed if the vision fails to improve. Pain; nausea; vomiting; are symptoms of acute angle glaucoma which are in the differential although your symptoms do not appear to be severe at this time and there seems to be another cause.

## 2023-03-08 ENCOUNTER — OFFICE VISIT (OUTPATIENT)
Dept: CARDIOLOGY CLINIC | Age: 73
End: 2023-03-08
Payer: MEDICARE

## 2023-03-08 VITALS
WEIGHT: 186 LBS | SYSTOLIC BLOOD PRESSURE: 96 MMHG | HEIGHT: 70 IN | HEART RATE: 60 BPM | DIASTOLIC BLOOD PRESSURE: 56 MMHG | OXYGEN SATURATION: 96 % | BODY MASS INDEX: 26.63 KG/M2

## 2023-03-08 DIAGNOSIS — I48.0 PAROXYSMAL ATRIAL FIBRILLATION (HCC): Primary | ICD-10-CM

## 2023-03-08 DIAGNOSIS — R00.1 BRADYCARDIA: ICD-10-CM

## 2023-03-08 DIAGNOSIS — G47.30 SLEEP APNEA, UNSPECIFIED TYPE: ICD-10-CM

## 2023-03-08 DIAGNOSIS — Z98.890 S/P ABLATION OF ATRIAL FIBRILLATION: ICD-10-CM

## 2023-03-08 DIAGNOSIS — Z86.79 S/P ABLATION OF ATRIAL FIBRILLATION: ICD-10-CM

## 2023-03-08 PROCEDURE — G8417 CALC BMI ABV UP PARAM F/U: HCPCS | Performed by: CLINICAL NURSE SPECIALIST

## 2023-03-08 PROCEDURE — G8484 FLU IMMUNIZE NO ADMIN: HCPCS | Performed by: CLINICAL NURSE SPECIALIST

## 2023-03-08 PROCEDURE — 3017F COLORECTAL CA SCREEN DOC REV: CPT | Performed by: CLINICAL NURSE SPECIALIST

## 2023-03-08 PROCEDURE — 99213 OFFICE O/P EST LOW 20 MIN: CPT | Performed by: CLINICAL NURSE SPECIALIST

## 2023-03-08 PROCEDURE — 1036F TOBACCO NON-USER: CPT | Performed by: CLINICAL NURSE SPECIALIST

## 2023-03-08 PROCEDURE — G8427 DOCREV CUR MEDS BY ELIG CLIN: HCPCS | Performed by: CLINICAL NURSE SPECIALIST

## 2023-03-08 PROCEDURE — 1123F ACP DISCUSS/DSCN MKR DOCD: CPT | Performed by: CLINICAL NURSE SPECIALIST

## 2023-03-08 ASSESSMENT — ENCOUNTER SYMPTOMS
CHEST TIGHTNESS: 0
COUGH: 0
ABDOMINAL PAIN: 0
EYE REDNESS: 0
SHORTNESS OF BREATH: 0
VOMITING: 0
NAUSEA: 0
FACIAL SWELLING: 0
WHEEZING: 0

## 2023-07-06 ENCOUNTER — TELEPHONE (OUTPATIENT)
Dept: GASTROENTEROLOGY | Age: 73
End: 2023-07-06

## 2023-07-06 NOTE — TELEPHONE ENCOUNTER
Kwesi Simental requests that clinic return their call. The best time to reach him is Anytime. Patient is ready to schedule his colon screening OA. Thank you.

## 2023-07-11 ENCOUNTER — OFFICE VISIT (OUTPATIENT)
Dept: CARDIOLOGY CLINIC | Age: 73
End: 2023-07-11
Payer: MEDICARE

## 2023-07-11 VITALS
DIASTOLIC BLOOD PRESSURE: 68 MMHG | HEART RATE: 65 BPM | BODY MASS INDEX: 26.63 KG/M2 | OXYGEN SATURATION: 98 % | HEIGHT: 70 IN | SYSTOLIC BLOOD PRESSURE: 94 MMHG | WEIGHT: 186 LBS

## 2023-07-11 DIAGNOSIS — I95.9 HYPOTENSION, UNSPECIFIED HYPOTENSION TYPE: ICD-10-CM

## 2023-07-11 DIAGNOSIS — I47.1 ATRIAL TACHYCARDIA (HCC): ICD-10-CM

## 2023-07-11 DIAGNOSIS — Z86.79 S/P ABLATION OF ATRIAL FIBRILLATION: ICD-10-CM

## 2023-07-11 DIAGNOSIS — Z98.890 S/P ABLATION OF ATRIAL FIBRILLATION: ICD-10-CM

## 2023-07-11 DIAGNOSIS — I48.0 PAROXYSMAL ATRIAL FIBRILLATION (HCC): Primary | ICD-10-CM

## 2023-07-11 DIAGNOSIS — G47.30 SLEEP APNEA, UNSPECIFIED TYPE: ICD-10-CM

## 2023-07-11 PROCEDURE — G8427 DOCREV CUR MEDS BY ELIG CLIN: HCPCS | Performed by: CLINICAL NURSE SPECIALIST

## 2023-07-11 PROCEDURE — 1036F TOBACCO NON-USER: CPT | Performed by: CLINICAL NURSE SPECIALIST

## 2023-07-11 PROCEDURE — G8417 CALC BMI ABV UP PARAM F/U: HCPCS | Performed by: CLINICAL NURSE SPECIALIST

## 2023-07-11 PROCEDURE — 1123F ACP DISCUSS/DSCN MKR DOCD: CPT | Performed by: CLINICAL NURSE SPECIALIST

## 2023-07-11 PROCEDURE — 3017F COLORECTAL CA SCREEN DOC REV: CPT | Performed by: CLINICAL NURSE SPECIALIST

## 2023-07-11 PROCEDURE — 99213 OFFICE O/P EST LOW 20 MIN: CPT | Performed by: CLINICAL NURSE SPECIALIST

## 2023-07-11 RX ORDER — FEXOFENADINE HCL 180 MG/1
TABLET ORAL
COMMUNITY

## 2023-07-11 RX ORDER — ASPIRIN 81 MG/1
81 TABLET ORAL DAILY
COMMUNITY

## 2023-07-11 ASSESSMENT — ENCOUNTER SYMPTOMS
NAUSEA: 0
EYE REDNESS: 0
ABDOMINAL PAIN: 0
CHEST TIGHTNESS: 0
WHEEZING: 0
COUGH: 0
FACIAL SWELLING: 0
VOMITING: 0
SHORTNESS OF BREATH: 0

## 2023-07-11 NOTE — PATIENT INSTRUCTIONS
Return in about 6 months (around 1/11/2024) for Dr Cathleen Mukherjee.   Call for worsening palpitations

## 2023-07-11 NOTE — PROGRESS NOTES
[facility] [address], [address] [place] [address]  Phone: [phone]  Fax: [phone]    OFFICE VISIT:  2023    Isaias Beard III - : 1950    Reason For Visit:  Eugenia Servin is a 68 y.o. male who is here for Follow-up and Atrial Fibrillation       Diagnosis Orders   1. Paroxysmal atrial fibrillation (HCC)        2. Atrial tachycardia ([address])        3. Sleep apnea, unspecified type        4. S/P ablation of atrial fibrillation        5. Hypotension, unspecified hypotension type                HPI  Pt is a retired physician, allergist, with a history of obstructive sleep apnea and atrial fibrillation returns  follow-up visit. Previous angiographic assessment revealed only luminal irregularity with some mild ectasia of his proximal LAD per heart cath in . Derrell Chowdhury In 2020, he underwent an ablation by Dr. Marnie Jovel. He  had some recurrent atrial fibrillation following the ablation, but nothing significant recently. Patient has seen Dr. Marnie Jovel  and wore a Zio patch heart monitor in the spring showing atrial tachycardia. Eliquis was discontinued due to a low [id number] score of 1 by Dr. Marnie Jovel. He notices some occasional palpitations which are brief and short-lived and monitors via his Apple Watch    He has had ongoing issues treating his sleep apnea and intolerance with CPAP. He was turned down for the inspire device. He is now using a BiPAP and things seem to be going better and he is tolerating it. Patient also supplanted at    Patient states he is able to do things like hiking uphill. He is planning a trip out [id number] [facility] which will include quite a bit of hiking. Denies dizziness lightheadedness or syncope. No complaints of chest pain or unusual dyspnea    His wife is concerned that his blood pressure runs on the low side. The patient seemed to tolerate quite well. He is fatigued when he awakens in the morning       Diana Lopez MD is PCP.   Isaias Michael

## 2023-07-12 PROBLEM — I47.19 ATRIAL TACHYCARDIA: Status: ACTIVE | Noted: 2023-07-12

## 2023-07-12 PROBLEM — I47.1 ATRIAL TACHYCARDIA (HCC): Status: ACTIVE | Noted: 2023-07-12

## 2023-10-05 ENCOUNTER — OFFICE VISIT (OUTPATIENT)
Dept: CARDIOLOGY CLINIC | Age: 73
End: 2023-10-05
Payer: MEDICARE

## 2023-10-05 VITALS
SYSTOLIC BLOOD PRESSURE: 98 MMHG | HEART RATE: 80 BPM | HEIGHT: 70 IN | DIASTOLIC BLOOD PRESSURE: 66 MMHG | BODY MASS INDEX: 25.77 KG/M2 | WEIGHT: 180 LBS

## 2023-10-05 DIAGNOSIS — Z86.79 S/P ABLATION OF ATRIAL FIBRILLATION: ICD-10-CM

## 2023-10-05 DIAGNOSIS — Z98.890 S/P ABLATION OF ATRIAL FIBRILLATION: ICD-10-CM

## 2023-10-05 DIAGNOSIS — I49.9 IRREGULAR HEART RATE: ICD-10-CM

## 2023-10-05 DIAGNOSIS — I48.0 PAROXYSMAL ATRIAL FIBRILLATION (HCC): Primary | ICD-10-CM

## 2023-10-05 DIAGNOSIS — G47.30 SLEEP APNEA, UNSPECIFIED TYPE: ICD-10-CM

## 2023-10-05 PROCEDURE — 93000 ELECTROCARDIOGRAM COMPLETE: CPT | Performed by: CLINICAL NURSE SPECIALIST

## 2023-10-05 PROCEDURE — 93246 EXT ECG>7D<15D RECORDING: CPT | Performed by: CLINICAL NURSE SPECIALIST

## 2023-10-05 PROCEDURE — G8427 DOCREV CUR MEDS BY ELIG CLIN: HCPCS | Performed by: CLINICAL NURSE SPECIALIST

## 2023-10-05 PROCEDURE — 99213 OFFICE O/P EST LOW 20 MIN: CPT | Performed by: CLINICAL NURSE SPECIALIST

## 2023-10-05 PROCEDURE — 1036F TOBACCO NON-USER: CPT | Performed by: CLINICAL NURSE SPECIALIST

## 2023-10-05 PROCEDURE — G8484 FLU IMMUNIZE NO ADMIN: HCPCS | Performed by: CLINICAL NURSE SPECIALIST

## 2023-10-05 PROCEDURE — 1123F ACP DISCUSS/DSCN MKR DOCD: CPT | Performed by: CLINICAL NURSE SPECIALIST

## 2023-10-05 PROCEDURE — 3017F COLORECTAL CA SCREEN DOC REV: CPT | Performed by: CLINICAL NURSE SPECIALIST

## 2023-10-05 PROCEDURE — G8417 CALC BMI ABV UP PARAM F/U: HCPCS | Performed by: CLINICAL NURSE SPECIALIST

## 2023-10-05 ASSESSMENT — ENCOUNTER SYMPTOMS
COUGH: 0
ABDOMINAL PAIN: 0
WHEEZING: 0
SHORTNESS OF BREATH: 0
FACIAL SWELLING: 0
CHEST TIGHTNESS: 0
NAUSEA: 0
VOMITING: 0
EYE REDNESS: 0

## 2023-10-05 NOTE — PROGRESS NOTES
concerns  Follow up with Ann Weiss MD for non cardiac problems  Report any new problems  Cardiovascular Fitness-Exercise as tolerated. Strive for 15 minutes of exercise most days of the week. Cardiac / HealthyDiet  Continue current medications as directed  Continue plan of treatment  It is always recommended that you bring your medicationsbottles with you to each visit - this is for your safety!        HARLEY Segovia

## 2023-10-26 ENCOUNTER — TELEPHONE (OUTPATIENT)
Dept: CARDIOLOGY CLINIC | Age: 73
End: 2023-10-26

## 2023-10-26 DIAGNOSIS — I48.0 PAROXYSMAL ATRIAL FIBRILLATION (HCC): Primary | ICD-10-CM

## 2023-10-26 PROCEDURE — 93248 EXT ECG>7D<15D REV&INTERPJ: CPT | Performed by: CLINICAL NURSE SPECIALIST

## 2023-10-26 NOTE — TELEPHONE ENCOUNTER
Called to give results of heart monitor, the patient wants to discuss the results with Dr Nick Magana at his next visit. The patient is a retired doctor.

## 2023-10-26 NOTE — TELEPHONE ENCOUNTER
----- Message from HARLEY Stark sent at 10/26/2023 10:28 AM CDT -----  Let patient know that his ZIO did not show any atrial fibrillation but he had over thousand episodes of short SVT longest lasting 23 seconds. Symptoms were associated with frequent premature beats. Would recommend adding low-dose beta-blocker to help with these fast rates. If okay with I will send him in Toprol 25 mg to take nightly.

## 2023-11-21 ENCOUNTER — OFFICE VISIT (OUTPATIENT)
Dept: CARDIOLOGY CLINIC | Age: 73
End: 2023-11-21
Payer: MEDICARE

## 2023-11-21 VITALS
DIASTOLIC BLOOD PRESSURE: 78 MMHG | BODY MASS INDEX: 25.62 KG/M2 | SYSTOLIC BLOOD PRESSURE: 116 MMHG | HEART RATE: 68 BPM | WEIGHT: 179 LBS | HEIGHT: 70 IN

## 2023-11-21 DIAGNOSIS — I47.19 ATRIAL TACHYCARDIA: Primary | ICD-10-CM

## 2023-11-21 PROCEDURE — 1036F TOBACCO NON-USER: CPT | Performed by: INTERNAL MEDICINE

## 2023-11-21 PROCEDURE — G8484 FLU IMMUNIZE NO ADMIN: HCPCS | Performed by: INTERNAL MEDICINE

## 2023-11-21 PROCEDURE — G8417 CALC BMI ABV UP PARAM F/U: HCPCS | Performed by: INTERNAL MEDICINE

## 2023-11-21 PROCEDURE — 99205 OFFICE O/P NEW HI 60 MIN: CPT | Performed by: INTERNAL MEDICINE

## 2023-11-21 PROCEDURE — G8427 DOCREV CUR MEDS BY ELIG CLIN: HCPCS | Performed by: INTERNAL MEDICINE

## 2023-11-21 PROCEDURE — 3017F COLORECTAL CA SCREEN DOC REV: CPT | Performed by: INTERNAL MEDICINE

## 2023-11-21 PROCEDURE — 1123F ACP DISCUSS/DSCN MKR DOCD: CPT | Performed by: INTERNAL MEDICINE

## 2023-11-21 RX ORDER — M-VIT,TX,IRON,MINS/CALC/FOLIC 27MG-0.4MG
1 TABLET ORAL DAILY
COMMUNITY

## 2023-11-21 RX ORDER — ACEBUTOLOL HYDROCHLORIDE 200 MG/1
200 CAPSULE ORAL 2 TIMES DAILY
Qty: 60 CAPSULE | Refills: 3 | Status: SHIPPED | OUTPATIENT
Start: 2023-11-21

## 2023-11-21 RX ORDER — TADALAFIL 5 MG/1
5 TABLET ORAL DAILY
COMMUNITY

## 2023-11-21 NOTE — PROGRESS NOTES
encounter: 1.778 m (5' 10\"). Weight as of this encounter: 81.2 kg (179 lb). Constitutional: He is oriented to person, place, and time. He appears well-developed and well-nourished in no acute distress. Neck:  Neck supple without JVD present. No trachea deviation present. No thyromegaly present. Eyes:Conjunctivae and EOM are normal. Pupils equal and reactive to light. ENT:Hearing appears normal.conjunctiva and lids are normal, ears and nose appear normal.  Cardiovascular: Normal rate, S1-S2 regular rhythm, normal heart sounds. No no murmur ascultated. No gallop and no friction rub. No carotid bruits. No peripheral edema. Pulmonary/Chest:  Lungs clear to auscultation bilaterally without evidence of respiratory distress. He without wheezes. He without rales or ronchi. Musculoskeletal: Normal range of motion. Gait is normal no assitive device. Head is normocephalic and atraumatic. Skin: Skin is warm and dry without rash or pallor. Psychiatric:He is alert and oriented to person, place, and time. He has a normal mood and affect. His behavior is normal. Thought content normal.       Lab Results   Component Value Date/Time    CREATININE 0.8 05/27/2021 09:00 AM    CREATININE 0.8 01/02/2020 06:45 AM    CREATININE 0.8 01/30/2013 09:50 AM    HGB 16.8 05/27/2021 09:00 AM    HGB 16.2 01/02/2020 06:45 AM    HGB 16.2 01/30/2013 09:50 AM           ECG 11/21/23    Sinus rhythm    TTE 2020 EF 70         Assessment, Recommendations, & Plan:  68 y.o. male with Atrial tachycardia following a pulmonary vein isolation a few years ago. This certainly increases the chances that this is a left-sided atrial tachycardia. We discussed options including observation, beta-blockade, and repeat electrophysiologic study. The patient wishes to try another beta-blocker and he thinks he was on Toprol in the past which dropped his blood pressure. I think the most beta-1 specific choice is likely acebutolol.   I will initiate

## 2023-12-20 ENCOUNTER — TELEPHONE (OUTPATIENT)
Dept: CARDIOLOGY CLINIC | Age: 73
End: 2023-12-20

## 2023-12-20 NOTE — TELEPHONE ENCOUNTER
Pt. Called for sooner appt. With Dr. Burks in re: to being unable to take acebutolol due to causing low blood pressure, 90/58. Dr. Burks doesn't have anything prior to appt. Day and offered aprn for sooner appt. Pt. Wants to wait since this medication was discussed with him and whether or not he should be taking it. Said they would send a mychart message to communicate of being unable to take. Advised if something changes to call.

## 2024-01-16 ENCOUNTER — OFFICE VISIT (OUTPATIENT)
Dept: CARDIOLOGY CLINIC | Age: 74
End: 2024-01-16
Payer: MEDICARE

## 2024-01-16 VITALS
HEART RATE: 81 BPM | DIASTOLIC BLOOD PRESSURE: 80 MMHG | SYSTOLIC BLOOD PRESSURE: 110 MMHG | BODY MASS INDEX: 26.48 KG/M2 | WEIGHT: 185 LBS | HEIGHT: 70 IN

## 2024-01-16 DIAGNOSIS — I47.19 ATRIAL TACHYCARDIA: Primary | ICD-10-CM

## 2024-01-16 PROCEDURE — 99214 OFFICE O/P EST MOD 30 MIN: CPT | Performed by: INTERNAL MEDICINE

## 2024-01-16 PROCEDURE — G8417 CALC BMI ABV UP PARAM F/U: HCPCS | Performed by: INTERNAL MEDICINE

## 2024-01-16 PROCEDURE — G8484 FLU IMMUNIZE NO ADMIN: HCPCS | Performed by: INTERNAL MEDICINE

## 2024-01-16 PROCEDURE — 1123F ACP DISCUSS/DSCN MKR DOCD: CPT | Performed by: INTERNAL MEDICINE

## 2024-01-16 PROCEDURE — 3017F COLORECTAL CA SCREEN DOC REV: CPT | Performed by: INTERNAL MEDICINE

## 2024-01-16 PROCEDURE — G8427 DOCREV CUR MEDS BY ELIG CLIN: HCPCS | Performed by: INTERNAL MEDICINE

## 2024-01-16 PROCEDURE — 1036F TOBACCO NON-USER: CPT | Performed by: INTERNAL MEDICINE

## 2024-01-16 NOTE — PROGRESS NOTES
MICHA Beard III is a 74 y.o. male presents with atrial tachycardia.  He has been through several beta-blockers all resulting in hypotension and acebutolol was no different.  The patient's blood pressure dropped to 90/60 and he was so fatigued he did not feel like doing anything all day.  This is certainly worse than the symptoms associated with the AT.      Review of Systems   Constitutional: Negative for fever, chills, diaphoresis, activity change, appetite change, fatigue and unexpected weight change.   Eyes: Negative for photophobia, pain, redness and visual disturbance.   Respiratory: Negative for apnea, cough, chest tightness, shortness of breath, wheezing and stridor.    Cardiovascular: Negative for chest pain, palpitations and leg swelling.   Gastrointestinal: Negative for abdominal distention.   Genitourinary: Negative for dysuria, urgency and frequency.   Musculoskeletal: Negative for myalgias, arthralgias and gait problem.   Skin: Negative for color change, pallor, rash and wound.   Neurological: Negative for dizziness, tremors, speech difficulty, weakness and numbness.   Hematological: Does not bruise/bleed easily.   Psychiatric/Behavioral: Negative.          Social History     Socioeconomic History    Marital status:      Spouse name: Not on file    Number of children: Not on file    Years of education: Not on file    Highest education level: Not on file   Occupational History    Not on file   Tobacco Use    Smoking status: Never    Smokeless tobacco: Never   Vaping Use    Vaping Use: Never used   Substance and Sexual Activity    Alcohol use: Yes     Comment: social    Drug use: No    Sexual activity: Yes     Partners: Female   Other Topics Concern    Not on file   Social History Narrative    Not on file     Social Determinants of Health     Financial Resource Strain: Not on file   Food Insecurity: Not on file   Transportation Needs: Not on file   Physical Activity: Not on file   Stress: Not

## 2025-04-12 NOTE — TELEPHONE ENCOUNTER
Please let the patient know that this is what Dr. Case Eye recommended.   Patient has a follow-up appointment with Dr. Manny Chandler on 1/20/2020 and can discuss further at that time concern for endocarditis  ROVERTO reviewed - no visible vegetation at this time    PLAN  - patient transferred to [facility] for CTSx eval for intervention given CVA, no vegetations on valves, but hx of vegetations on valves and current   - aware no vegetation on current ROVERTO but ID concerned that it may have broken off to cause CRAO and CVA and may be a recurrent problem.

## (undated) DEVICE — SOLUTION IV IRRIG POUR BRL 0.9% SODIUM CHL 2F7124

## (undated) DEVICE — Z DISCONTINUED USE 2272117 DRAPE SURG 3 QTR N INVASIVE 2 LAYR DISP

## (undated) DEVICE — 3M™ IOBAN™ 2 ANTIMICROBIAL INCISE DRAPE 6650EZ: Brand: IOBAN™ 2

## (undated) DEVICE — Device

## (undated) DEVICE — HANDPIECE SET WITH COAXIAL MULTI-ORIFICE TIP AND SUCTION TUBE: Brand: INTERPULSE

## (undated) DEVICE — STERILE POLYISOPRENE POWDER-FREE SURGICAL GLOVES: Brand: PROTEXIS

## (undated) DEVICE — SUTURE VCRL SZ 1 L27IN ABSRB UD L36MM CP-1 1/2 CIR REV CUT J268H

## (undated) DEVICE — SYSTEM SKIN CLSR 22CM DERMBND PRINEO

## (undated) DEVICE — Z INACTIVE USE 2660664 SOLUTION IRRIG 3000ML 0.9% SOD CHL USP UROMATIC PLAS CONT

## (undated) DEVICE — COVER,TABLE,44X90,STERILE: Brand: MEDLINE

## (undated) DEVICE — C-ARM: Brand: UNBRANDED

## (undated) DEVICE — TRAP POLYP ETRAP

## (undated) DEVICE — GLOVE SURG SZ 7 CRM LTX FREE POLYISOPRENE POLYMER BEAD ANTI

## (undated) DEVICE — BIT DRL DIA6MM GLEN S STL TRABECULAR MTL CANN W/ STP REUSE

## (undated) DEVICE — SHOULDER CDS

## (undated) DEVICE — ENDO KIT,LOURDES HOSPITAL: Brand: MEDLINE INDUSTRIES, INC.

## (undated) DEVICE — DUAL CUT SAGITTAL BLADE

## (undated) DEVICE — GLOVE SURG SZ 65 CRM LTX FREE POLYISOPRENE POLYMER BEAD ANTI

## (undated) DEVICE — DRAPE,SHOULDER,BEACH CHAIR,STERILE: Brand: MEDLINE

## (undated) DEVICE — SUTURE VCRL SZ 3-0 L27IN ABSRB UD L26MM SH 1/2 CIR J416H

## (undated) DEVICE — ELECTROSURGICAL PENCIL BUTTON SWITCH NON COATED BLADE ELECTRODE 10 FT (3 M) CORD HOLSTER: Brand: MEGADYNE

## (undated) DEVICE — IMMOBILIZER SLING: Brand: DEROYAL

## (undated) DEVICE — SUTURE VCRL SZ 2-0 L27IN ABSRB UD L26MM SH 1/2 CIR J417H

## (undated) DEVICE — LARYNGOSCOPE BLDE MAC HNDL M SZ 35 ST CURAPLEX CURAVIEW LED

## (undated) DEVICE — UNDERGLOVE SURG SZ 8 FNGR THK0.21MIL GRN LTX BEAD CUF

## (undated) DEVICE — IMMOBILIZER ORTH CONTACT CLOSURE STRP LG 18X9 IN PROCARE

## (undated) DEVICE — BLADE SURG NO10 C STL DISP ST

## (undated) DEVICE — TUBE ET 7.5MM NSL ORAL BASIC CUF INTMED MURPHY EYE RADPQ

## (undated) DEVICE — E-Z CLEAN, NON-STICK, PTFE COATED, ELECTROSURGICAL BLADE ELECTRODE, 6.5 INCH (16.5 CM): Brand: MEGADYNE

## (undated) DEVICE — T-MAX DISPOSABLE FACE MASK 8 PER BOX